# Patient Record
Sex: MALE | Race: WHITE | Employment: OTHER | URBAN - METROPOLITAN AREA
[De-identification: names, ages, dates, MRNs, and addresses within clinical notes are randomized per-mention and may not be internally consistent; named-entity substitution may affect disease eponyms.]

---

## 2017-01-18 ENCOUNTER — RECORDS - HEALTHEAST (OUTPATIENT)
Dept: LAB | Facility: CLINIC | Age: 72
End: 2017-01-18

## 2017-01-18 LAB
CHOLEST SERPL-MCNC: 197 MG/DL
FASTING STATUS PATIENT QL REPORTED: YES
HDLC SERPL-MCNC: 42 MG/DL
LDLC SERPL CALC-MCNC: 134 MG/DL
PSA SERPL-MCNC: 0.9 NG/ML (ref 0–4.5)
TRIGL SERPL-MCNC: 106 MG/DL

## 2017-11-09 ENCOUNTER — RECORDS - HEALTHEAST (OUTPATIENT)
Dept: ADMINISTRATIVE | Facility: OTHER | Age: 72
End: 2017-11-09

## 2017-12-07 ENCOUNTER — RECORDS - HEALTHEAST (OUTPATIENT)
Dept: ADMINISTRATIVE | Facility: OTHER | Age: 72
End: 2017-12-07

## 2017-12-11 ENCOUNTER — RECORDS - HEALTHEAST (OUTPATIENT)
Dept: ADMINISTRATIVE | Facility: OTHER | Age: 72
End: 2017-12-11

## 2018-01-18 ENCOUNTER — RECORDS - HEALTHEAST (OUTPATIENT)
Dept: ADMINISTRATIVE | Facility: OTHER | Age: 73
End: 2018-01-18

## 2018-01-19 ENCOUNTER — RECORDS - HEALTHEAST (OUTPATIENT)
Dept: ADMINISTRATIVE | Facility: OTHER | Age: 73
End: 2018-01-19

## 2018-02-12 ENCOUNTER — HOSPITAL ENCOUNTER (OUTPATIENT)
Dept: RADIOLOGY | Facility: HOSPITAL | Age: 73
Discharge: HOME | End: 2018-02-12

## 2018-02-12 DIAGNOSIS — J18.9 RECURRENT PNEUMONIA: ICD-10-CM

## 2018-03-08 ENCOUNTER — OFFICE VISIT - HEALTHEAST (OUTPATIENT)
Dept: PULMONOLOGY | Facility: OTHER | Age: 73
End: 2018-03-08

## 2018-03-08 DIAGNOSIS — J18.9 RECURRENT PNEUMONIA: ICD-10-CM

## 2018-03-08 DIAGNOSIS — J18.9 PNEUMONIA: ICD-10-CM

## 2018-03-08 ASSESSMENT — MIFFLIN-ST. JEOR: SCORE: 1671.36

## 2018-04-06 ENCOUNTER — HOSPITAL ENCOUNTER (OUTPATIENT)
Dept: CT IMAGING | Facility: HOSPITAL | Age: 73
Discharge: HOME | End: 2018-04-06
Attending: INTERNAL MEDICINE

## 2018-04-06 ENCOUNTER — COMMUNICATION - HEALTHEAST (OUTPATIENT)
Dept: INTENSIVE CARE | Facility: CLINIC | Age: 73
End: 2018-04-06

## 2018-04-06 ENCOUNTER — HOSPITAL ENCOUNTER (OUTPATIENT)
Dept: RESPIRATORY THERAPY | Facility: HOSPITAL | Age: 73
Discharge: HOME | End: 2018-04-06
Attending: INTERNAL MEDICINE

## 2018-04-06 DIAGNOSIS — J18.9 PNEUMONIA: ICD-10-CM

## 2018-04-06 LAB — HGB BLD-MCNC: 15.9 G/DL (ref 14–18)

## 2018-05-11 ENCOUNTER — OFFICE VISIT - HEALTHEAST (OUTPATIENT)
Dept: PULMONOLOGY | Facility: OTHER | Age: 73
End: 2018-05-11

## 2018-05-11 DIAGNOSIS — J18.9 RECURRENT PNEUMONIA: ICD-10-CM

## 2018-10-24 ENCOUNTER — RECORDS - HEALTHEAST (OUTPATIENT)
Dept: LAB | Facility: CLINIC | Age: 73
End: 2018-10-24

## 2018-10-26 LAB — BACTERIA SPEC CULT: NORMAL

## 2018-11-28 ENCOUNTER — RECORDS - HEALTHEAST (OUTPATIENT)
Dept: LAB | Facility: CLINIC | Age: 73
End: 2018-11-28

## 2018-11-28 LAB
ALBUMIN SERPL-MCNC: 3.7 G/DL (ref 3.5–5)
ALP SERPL-CCNC: 59 U/L (ref 45–120)
ALT SERPL W P-5'-P-CCNC: 32 U/L (ref 0–45)
ANION GAP SERPL CALCULATED.3IONS-SCNC: 9 MMOL/L (ref 5–18)
AST SERPL W P-5'-P-CCNC: 23 U/L (ref 0–40)
BILIRUB SERPL-MCNC: 0.4 MG/DL (ref 0–1)
BUN SERPL-MCNC: 27 MG/DL (ref 8–28)
CALCIUM SERPL-MCNC: 9.4 MG/DL (ref 8.5–10.5)
CHLORIDE BLD-SCNC: 106 MMOL/L (ref 98–107)
CHOLEST SERPL-MCNC: 147 MG/DL
CO2 SERPL-SCNC: 23 MMOL/L (ref 22–31)
CREAT SERPL-MCNC: 1.01 MG/DL (ref 0.7–1.3)
FASTING STATUS PATIENT QL REPORTED: ABNORMAL
GFR SERPL CREATININE-BSD FRML MDRD: >60 ML/MIN/1.73M2
GLUCOSE BLD-MCNC: 89 MG/DL (ref 70–125)
HDLC SERPL-MCNC: 36 MG/DL
LDLC SERPL CALC-MCNC: 86 MG/DL
POTASSIUM BLD-SCNC: 4.5 MMOL/L (ref 3.5–5)
PROT SERPL-MCNC: 7 G/DL (ref 6–8)
SODIUM SERPL-SCNC: 138 MMOL/L (ref 136–145)
TRIGL SERPL-MCNC: 126 MG/DL

## 2018-12-04 ENCOUNTER — RECORDS - HEALTHEAST (OUTPATIENT)
Dept: ADMINISTRATIVE | Facility: OTHER | Age: 73
End: 2018-12-04

## 2018-12-21 ENCOUNTER — AMBULATORY - HEALTHEAST (OUTPATIENT)
Dept: PULMONOLOGY | Facility: OTHER | Age: 73
End: 2018-12-21

## 2018-12-21 ENCOUNTER — OFFICE VISIT - HEALTHEAST (OUTPATIENT)
Dept: PULMONOLOGY | Facility: OTHER | Age: 73
End: 2018-12-21

## 2018-12-21 DIAGNOSIS — R09.82 PND (POST-NASAL DRIP): ICD-10-CM

## 2018-12-21 DIAGNOSIS — R05.9 COUGH: ICD-10-CM

## 2018-12-21 DIAGNOSIS — R05.3 CHRONIC COUGH: ICD-10-CM

## 2018-12-21 ASSESSMENT — MIFFLIN-ST. JEOR: SCORE: 1699.94

## 2019-01-08 ENCOUNTER — RECORDS - HEALTHEAST (OUTPATIENT)
Dept: RADIOLOGY | Facility: CLINIC | Age: 74
End: 2019-01-08

## 2019-03-20 ENCOUNTER — OFFICE VISIT - HEALTHEAST (OUTPATIENT)
Dept: PULMONOLOGY | Facility: OTHER | Age: 74
End: 2019-03-20

## 2019-03-20 DIAGNOSIS — R05.3 CHRONIC COUGH: ICD-10-CM

## 2019-03-20 RX ORDER — BENZONATATE 100 MG/1
100 CAPSULE ORAL 3 TIMES DAILY PRN
Qty: 90 CAPSULE | Refills: 6 | Status: SHIPPED | OUTPATIENT
Start: 2019-03-20

## 2020-01-04 ENCOUNTER — RECORDS - HEALTHEAST (OUTPATIENT)
Dept: LAB | Facility: CLINIC | Age: 75
End: 2020-01-04

## 2020-01-05 LAB
ALBUMIN SERPL-MCNC: 4 G/DL (ref 3.5–5)
ALP SERPL-CCNC: 64 U/L (ref 45–120)
ALT SERPL W P-5'-P-CCNC: 20 U/L (ref 0–45)
ANION GAP SERPL CALCULATED.3IONS-SCNC: 12 MMOL/L (ref 5–18)
AST SERPL W P-5'-P-CCNC: 20 U/L (ref 0–40)
BILIRUB SERPL-MCNC: 0.6 MG/DL (ref 0–1)
BUN SERPL-MCNC: 22 MG/DL (ref 8–28)
CALCIUM SERPL-MCNC: 9.6 MG/DL (ref 8.5–10.5)
CHLORIDE BLD-SCNC: 104 MMOL/L (ref 98–107)
CO2 SERPL-SCNC: 23 MMOL/L (ref 22–31)
CREAT SERPL-MCNC: 1.11 MG/DL (ref 0.7–1.3)
ERYTHROCYTE [SEDIMENTATION RATE] IN BLOOD BY WESTERGREN METHOD: 19 MM/HR (ref 0–15)
GFR SERPL CREATININE-BSD FRML MDRD: >60 ML/MIN/1.73M2
GLUCOSE BLD-MCNC: 114 MG/DL (ref 70–125)
MAGNESIUM SERPL-MCNC: 2.1 MG/DL (ref 1.8–2.6)
POTASSIUM BLD-SCNC: 4.8 MMOL/L (ref 3.5–5)
PROT SERPL-MCNC: 7.4 G/DL (ref 6–8)
SODIUM SERPL-SCNC: 139 MMOL/L (ref 136–145)
TSH SERPL DL<=0.005 MIU/L-ACNC: 3.62 UIU/ML (ref 0.3–5)
VIT B12 SERPL-MCNC: 325 PG/ML (ref 213–816)

## 2020-03-19 ENCOUNTER — RECORDS - HEALTHEAST (OUTPATIENT)
Dept: LAB | Facility: CLINIC | Age: 75
End: 2020-03-19

## 2020-03-19 LAB
ANION GAP SERPL CALCULATED.3IONS-SCNC: 11 MMOL/L (ref 5–18)
BUN SERPL-MCNC: 18 MG/DL (ref 8–28)
CALCIUM SERPL-MCNC: 9 MG/DL (ref 8.5–10.5)
CHLORIDE BLD-SCNC: 104 MMOL/L (ref 98–107)
CO2 SERPL-SCNC: 22 MMOL/L (ref 22–31)
CREAT SERPL-MCNC: 0.87 MG/DL (ref 0.7–1.3)
GFR SERPL CREATININE-BSD FRML MDRD: >60 ML/MIN/1.73M2
GLUCOSE BLD-MCNC: 119 MG/DL (ref 70–125)
POTASSIUM BLD-SCNC: 4.1 MMOL/L (ref 3.5–5)
SODIUM SERPL-SCNC: 137 MMOL/L (ref 136–145)

## 2021-02-04 ENCOUNTER — RECORDS - HEALTHEAST (OUTPATIENT)
Dept: LAB | Facility: CLINIC | Age: 76
End: 2021-02-04

## 2021-02-04 LAB
ALBUMIN SERPL-MCNC: 4.2 G/DL (ref 3.5–5)
ALP SERPL-CCNC: 54 U/L (ref 45–120)
ALT SERPL W P-5'-P-CCNC: 33 U/L (ref 0–45)
ANION GAP SERPL CALCULATED.3IONS-SCNC: 13 MMOL/L (ref 5–18)
AST SERPL W P-5'-P-CCNC: 26 U/L (ref 0–40)
BILIRUB SERPL-MCNC: 0.8 MG/DL (ref 0–1)
BUN SERPL-MCNC: 25 MG/DL (ref 8–28)
CALCIUM SERPL-MCNC: 9.1 MG/DL (ref 8.5–10.5)
CHLORIDE BLD-SCNC: 108 MMOL/L (ref 98–107)
CHOLEST SERPL-MCNC: 117 MG/DL
CO2 SERPL-SCNC: 20 MMOL/L (ref 22–31)
CREAT SERPL-MCNC: 1.03 MG/DL (ref 0.7–1.3)
FASTING STATUS PATIENT QL REPORTED: ABNORMAL
GFR SERPL CREATININE-BSD FRML MDRD: >60 ML/MIN/1.73M2
GLUCOSE BLD-MCNC: 92 MG/DL (ref 70–125)
HDLC SERPL-MCNC: 35 MG/DL
LDLC SERPL CALC-MCNC: 62 MG/DL
POTASSIUM BLD-SCNC: 4.8 MMOL/L (ref 3.5–5)
PROT SERPL-MCNC: 6.8 G/DL (ref 6–8)
PSA SERPL-MCNC: 0.6 NG/ML (ref 0–6.5)
SODIUM SERPL-SCNC: 141 MMOL/L (ref 136–145)
TRIGL SERPL-MCNC: 98 MG/DL

## 2021-05-25 ENCOUNTER — RECORDS - HEALTHEAST (OUTPATIENT)
Dept: ADMINISTRATIVE | Facility: CLINIC | Age: 76
End: 2021-05-25

## 2021-06-01 VITALS — WEIGHT: 205.7 LBS | BODY MASS INDEX: 29.45 KG/M2 | HEIGHT: 70 IN

## 2021-06-01 VITALS — BODY MASS INDEX: 30.65 KG/M2 | WEIGHT: 210.6 LBS

## 2021-06-02 VITALS — WEIGHT: 212 LBS | BODY MASS INDEX: 30.35 KG/M2 | HEIGHT: 70 IN

## 2021-06-02 VITALS — WEIGHT: 216.2 LBS | BODY MASS INDEX: 31.47 KG/M2

## 2021-06-16 PROBLEM — R05.3 CHRONIC COUGH: Status: ACTIVE | Noted: 2018-12-21

## 2021-06-16 PROBLEM — R09.82 PND (POST-NASAL DRIP): Status: ACTIVE | Noted: 2018-12-21

## 2021-06-16 NOTE — PROGRESS NOTES
Pulmonary Clinic Outpatient Consultation    Assessment and Plan:   Sriram Doshi is a 72 y.o. male without significant PMHx seen for recurrent pneumonia, cough and dyspnea. His story and imaging seems to fit with infectious pneumonia. Symptomatically, he seems to have responded favorably to several rounds of antibiotics as well as oseltamivir. He is not quite back to his baseline but feels significantly improved since his symptoms began back in November. His CT scans do raise the possibility of an organizing pneumonia or other interstitial process and we will need to do another scan in several months to ensure that the prior consolidations are clearing up. There are no findings to suggest malignancy at this time. If they are still there and he has symptoms will need to pursue an invasive workup with bronchoscopy.    Recommendations:  - Repeat CT chest 2 months from January scan  - PFTs at next visit  - recommended OTC therapies for cough such as tessalon perles. He prefers to avoid anything with narcotics.  - encouraged him to remain active and keep exercising  - I listed levofloxacin as an allergy and removed the PCN allergy which is likely clinically insignificant  - UTD with PSV23. PCV13 will be given today. He did not get a flu shot this year due to his recent respiratory illnesses.  - Follow up with me in 2 months. Further testing will depend on his symptoms and the appearance of the next CT scan.    All questions answered, patient agrees with the plan.      CCx: recurrent pneumonia    HPI: 72 year old gentleman without significant PMHx seen in consultation for recurrent pneumonia. His story began in November of 2017 when he presented to his PMD with cough. He was given a course of doxycycline for 10 days. He followed up in Dec 2017 with improvement in symptoms but repeat CT in Jan 2018 showed persistent right-sided infiltrates and some new areas of bronchopneumonia. No masses or adenopathy were seen. He was  "given another course of antibiotics, this time Levofloxacin. He also received a course of Tamiflu at some point but he is not sure when. He had significant joint pains with the levofloxacin. He underwent swallow eval recently that was negative for aspiration. He does feel significantly improved since the Fall. He is able to exercise more and his cough is much less bothersome. He does feel he is out of shape due to the recent illnesses and is trying to increase his activity level.    He is using albuterol nebs. He does eat lying down but says he does not have issues with reflux or aspiration. Running 5-9 miles per week in an indoor gym.     At baseline he is very active and healthy. No chest pain. Very occasional cough.    On and off 2 year smoker. Quit 1969.    ROS:  A 12-system review was obtained and was negative with the exception of the symptoms endorsed in the history of present illness.    PMH:  No past medical history on file.    PSH:  No past surgical history on file.    Allergies:  Allergies   Allergen Reactions     Penicillins Rash       Family HX:  No family history on file.    Social Hx:  Social History     Social History     Marital status:      Spouse name: N/A     Number of children: N/A     Years of education: N/A     Occupational History     Not on file.     Social History Main Topics     Smoking status: Former Smoker     Quit date: 3/8/1969     Smokeless tobacco: Never Used     Alcohol use Not on file     Drug use: Not on file     Sexual activity: Not on file     Other Topics Concern     Not on file     Social History Narrative     No narrative on file       Current Meds:  No current outpatient prescriptions on file.     No current facility-administered medications for this visit.        Physical Exam:  /76  Pulse (!) 56  Resp 20  Ht 5' 9.5\" (1.765 m)  Wt 205 lb 11.2 oz (93.3 kg)  SpO2 95% Comment: RA  BMI 29.94 kg/m2  Gen: awake, alert, oriented, no distress  HEENT: nasal " turbinates are unremarkable, no oropharyngeal lesions, no cervical or supraclavicular lymphadenopathy  CV: RRR, no M/G/R  Resp: slight right basilar crackles. Left is clear. No wheezing or rhonchi.  Abd: soft, nontender, no palpable organomegaly  Skin: no apparent rashes  Ext: no cyanosis, clubbing or edema  Neuro: alert, nonfocal    Labs:  reviewed    Imaging studies:  Swallow study Jan 2018:  IMPRESSION:   CONCLUSION:  1.  No aspiration. There is transient penetration of thin consistency barium.  2.  Early pourover noted.  3.  See the speech pathology report for details.    CT scans from Dec 2017 and Jan 2018 were personally reviewed. Scans consistent with persistent bronchopneumonia and possibly organizing pneumonia with right sided predominance.    PFT's   None available    Jorge Luis (Kwaku) MD Cassie  [facility] Pulmonary & [facility]  Pager [phone]  Clinic [phone]

## 2021-06-16 NOTE — PROGRESS NOTES
"Speech Language/Pathology  Videofluoroscopic Swallow Study       Problem:  There is no problem list on file for this patient.      Onset date: 1/19/2018 (order date)  Reason for evaluation: Assess for dysphagia/aspiration  Pertinent History: Recurrent pneumonia  Current Diet: Regular textures and thin liquids  Baseline Diet: Regular textures and thin liquids    Patient is a 72 year old male referred due to concerns of aspiration. He reports, \"I've had pneumonia all winter.\" When asked if he experiences any coughing or choking when eating or drinking, patient states, \"Well, I have some bad eating habits. I eat laying down a lot. My father did that too.\" The purpose of this evaluation is to evaluate the oropharyngeal phase of patient's swallow, determine his aspiration risk, and rule out aspiration as the cause of his recurrent pneumonia.    Patient presents as pleasant and cooperative during this evaluation. He appears to be in good health overall. No coughing, wheezing, etc. was noted during this visit.  An  was not applicable    Patient was given honey-thick, puree, and thin consistencies of barium, as well as a solid (i.e., barium-coated rahul cracker).    Oral Phase:    Dentition/Oral hygiene: Adequate    Bolus prep and oral control were not impaired. Mastication was safe and effective and the patient used rotary chewing.    Anterior-Posterior transit was not impaired.    Premature spillage did not occur with any consistency.    Tongue base retraction was not observed .    Oral stasis did not occur with any consistency.    Pharyngeal Phase:    Aspiration did not occur with any consistency during this study.     There was transient laryngeal penetration with sips of thin liquid taken via straw. This was shallow and cleared spontaneously. No penetration noted with sips of thin via cup.    Swallow response was delayed with thin liquid. This resulted in pourover past the epiglottis and, at times, to the " pyriform sinuses.    Epiglottic movement was complete consistently across texture trials.    Pharyngeal stasis did not occur with any consistency.    Pharyngeal constriction was not impaired.    Hyolaryngeal elevation was mildly reduced. Hyolaryngeal excursion was mildly reduced.    Cricopharyngeal function was not impaired. Cervical esophageal function was not impaired.    Assessment:    Patient demonstrated no oral dysphagia and mild pharyngeal dysphagia. Overall, patient's swallow function is WNL for his age group.    Patient is at low aspiration risk with all intake. Aspiration risk increases slightly if patient drinks thin liquids with a straw.    Rehab potential is good based on prior level of function and evaluation results.    Recommendations:    Plan: Continue current diet of Regular textures and thin liquids    Strategies: Patient to follow standard aspiration precautions, including sitting fully upright for all intake, eating slowly, and taking small bites and sips. Patient may also wish to avoid drinking from straws.    Speech Therapy is not recommended at this time    Referrals: N/A     Reviewed history of swallow problem with patient and verbally explained roles of SLP and radiologist. Verbally explained process of VFSS prior to administration of barium. Verbally explained results and recommendations to patient. SLP answered questions and stated that a written copy of this report will be faxed to patient's referring provider. Patient verbalized understanding.    30 dysphagia minutes    Eleanor Finn MA, CCC-SLP

## 2021-06-17 NOTE — PROGRESS NOTES
Pulmonary Clinic Follow-up Visit    Assessment and Plan:   Sriram Doshi is a 72 y.o. male without significant PMHx seen for recurrent pneumonia, cough and dyspnea. He has improved significantly. CT chest looks much better so I doubt he has organizing pneumonia at this point. PFTs are normal. He is able to exercise regularly without breathing limitations. Explained that his cough may persist for up to several months after the pneumonia.     Recommendations:  - Encouraged him to continue to exercise and lose weight  - UTD with PSV23 and PCV13. Recommend annual flu shot.  - He can follow up with his PMD going forward; he will call me if he has any questions or concerns.    CCx: follow up of recurrent  pneumonia    HPI: Interim history: I last saw Refugio on 3/8. Since that time, he has been doing very well. Still has a nagging cough at times, non productive, very mild and generally does not bother him. He is trying to lose weight - wants to lose about 20lbs. He still runs and bikes regularly without any limitations.    ROS:  A 12-system review was obtained and was negative with the exception of the symptoms endorsed in the history of present illness.    PMH:  Past Medical History:   Diagnosis Date     Influenza A with pneumonia     winter 2018       PSH:  No past surgical history on file.    Allergies:  Allergies   Allergen Reactions     Levofloxacin      Caused severe joint pains and aches       Family HX:  No family history on file.    Social Hx:  Social History     Social History     Marital status:      Spouse name: N/A     Number of children: N/A     Years of education: N/A     Occupational History     Not on file.     Social History Main Topics     Smoking status: Former Smoker     Packs/day: 1.00     Years: 3.00     Quit date: 3/8/1969     Smokeless tobacco: Never Used     Alcohol use Not on file     Drug use: Not on file     Sexual activity: Not on file     Other Topics Concern     Not on file     Social  History Narrative       Current Meds:  No current outpatient prescriptions on file.     No current facility-administered medications for this visit.        Physical Exam:  /76  Pulse (!) 52  Resp 20  Wt 210 lb 9.6 oz (95.5 kg)  SpO2 95% Comment: RA  BMI 30.65 kg/m2  Gen: awake, alert, oriented, no distress  HEENT: nasal turbinates are unremarkable, no oropharyngeal lesions, no cervical or supraclavicular lymphadenopathy  CV: RRR, no M/G/R  Resp: CTAB, no focal crackles or wheezes  Abd: soft, nontender, no palpable organomegaly  Skin: no apparent rashes  Ext: no cyanosis, clubbing or edema  Neuro: alert, nonfocal    Labs:  reviewed    Imaging studies:  CT chest April 2018  IMPRESSION:   CONCLUSION:  1.  Near resolution of the consolidative opacities in the right upper lobe, which were likely infectious. Otherwise no change from the comparison study.  2.  Coronary artery calcification.  3.  Hepatic steatosis.    PFT's  FEV1/FVC is 0.77 and is normal.  FEV1 is 91% predicted and is normal.  FVC is 89% predicted and normal.  There was no improvement in spirometry after a single inhaled dose of bronchodilator.  TLC is 92% predicted and is normal.  RV is 110% predicted and is normal.  DLCO is 109% predicted and is normal when it   is corrected for hemoglobin.     Impression:  Full Pulmonary Function Test is normal.  Flow volume loops do not suggest any abnormalities.    Jorge Luis Matthews MD (Avi)  [facility] Pulmonary & Critical Care  Pager [phone]  Clinic [phone]

## 2021-06-17 NOTE — PROGRESS NOTES
RESPIRATORY CARE NOTE     Patient Name: Sriram Doshi  Today's Date: 4/6/2018     Complete PFT done. Pt performed tests with good effort. Test results meet ATS criteria. Results scanned into epic. Pt left in no distress.       Leonarda Ulloa

## 2021-06-19 NOTE — LETTER
Letter by Jorge Luis Matthews MD at      Author: Jorge Luis Matthews MD Service: -- Author Type: --    Filed:  Encounter Date: 3/20/2019 Status: (Other)         Tian Mauro MD  [address] [place] [id number]                                  March 20, 2019    Patient: Sriram Doshi   MR Number: [id number]   YOB: 1945   Date of Visit: 3/20/2019     Dear Dr. Nj MD:    Thank you for referring Sriram Doshi to me for evaluation. Below are the relevant portions of my assessment and plan of care.    If you have questions, please do not hesitate to call me. I look forward to following Sriram along with you.    Sincerely,        Jorge Luis Matthews MD          CC  No Recipients  Jorge Luis Matthews MD  3/20/2019  9:23 AM  Sign at close encounter  Pulmonary Clinic Follow-up Visit    Assessment and Plan:   Sriram Doshi is a 73 y.o. male without significant PMHx seen previously for recurrent pneumonia, cough and dyspnea which had resolved as of April-May 2018. He then came back to me last December for chronic coughin, which we attributed to post-nasal drip. He had symptomatic improvement with nasal corticosteroid. He has no shortness of breath. PFTs were normal last year.     Recommendations:  - continue the flonase for another few weeks, if he's stable I told him he could use it as needed  - continue tessalon perles OTC as needed for coughing  - UTD with PCV13 and PSV23. Recommend annual flu shot.  - encouraged him to continue to exercise and remain active.    Follow up as needed. He'll call me with any questions of concerns.     CCx: follow up of recurrent  pneumonia    HPI: Interim history: I last saw Refugio on on 12/21/2018. At that visit, I recommended a trial of FLonase for PND and chronic coughing. His cough improved. He feels the flonase was helpful. He still coughs occasionally, mostly in the morning, but then he's fine throughout the day. Denies GERD symptoms. He has gained some weight, feels  out of shape, not able to exercise like he used to due to some joint aches/pains and what he calls older age. He is able to do spin classes in the winter and has no breathing limitations. He is hoping to get back into cycling when it gets warmer.     ROS:  A 12-system review was obtained and was negative with the exception of the symptoms endorsed in the history of present illness.    PMH:  Past Medical History:   Diagnosis Date   ? Influenza A with pneumonia     winter 2018       PSH:  No past surgical history on file.    Allergies:  Allergies   Allergen Reactions   ? Levofloxacin      Caused severe joint pains and aches       Family HX:  No family history on file.    Social Hx:  Social History     Socioeconomic History   ? Marital status:      Spouse name: Not on file   ? Number of children: Not on file   ? Years of education: Not on file   ? Highest education level: Not on file   Occupational History   ? Not on file   Social Needs   ? Financial resource strain: Not on file   ? Food insecurity:     Worry: Not on file     Inability: Not on file   ? Transportation needs:     Medical: Not on file     Non-medical: Not on file   Tobacco Use   ? Smoking status: Former Smoker     Packs/day: 1.00     Years: 3.00     Pack years: 3.00     Last attempt to quit: 3/8/1969     Years since quittin.0   ? Smokeless tobacco: Never Used   Substance and Sexual Activity   ? Alcohol use: Not on file   ? Drug use: Not on file   ? Sexual activity: Not on file   Lifestyle   ? Physical activity:     Days per week: Not on file     Minutes per session: Not on file   ? Stress: Not on file   Relationships   ? Social connections:     Talks on phone: Not on file     Gets together: Not on file     Attends Voodoo service: Not on file     Active member of club or organization: Not on file     Attends meetings of clubs or organizations: Not on file     Relationship status: Not on file   ? Intimate partner violence:     Fear of current  or ex partner: Not on file     Emotionally abused: Not on file     Physically abused: Not on file     Forced sexual activity: Not on file   Other Topics Concern   ? Not on file   Social History Narrative   ? Not on file       Current Meds:  Current Outpatient Medications   Medication Sig Dispense Refill   ? fluticasone (FLONASE) 50 mcg/actuation nasal spray 1-2 sprays per nostril daily for 4-6 weeks minimum 16 g 12     No current facility-administered medications for this visit.        Physical Exam:  There were no vitals taken for this visit.  Gen: awake, alert, oriented, no distress  HEENT: nasal turbinates with very mild erythema no edema, improved from last visit.   CV: RRR, no M/G/R  Resp: very slight crackles at right base. Good air mvmt. Now wheezing.  Abd: soft, nontender, no palpable organomegaly  Skin: no apparent rashes  Ext: no cyanosis, clubbing or edema  Neuro: alert, nonfocal    Labs:  Reviewed  Chem panel normal.  Throat culture UF    Imaging studies:  CT chest April 2018  IMPRESSION:   CONCLUSION:  1.  Near resolution of the consolidative opacities in the right upper lobe, which were likely infectious. Otherwise no change from the comparison study.  2.  Coronary artery calcification.  3.  Hepatic steatosis.    PFT's  FEV1/FVC is 0.77 and is normal.  FEV1 is 91% predicted and is normal.  FVC is 89% predicted and normal.  There was no improvement in spirometry after a single inhaled dose of bronchodilator.  TLC is 92% predicted and is normal.  RV is 110% predicted and is normal.  DLCO is 109% predicted and is normal when it   is corrected for hemoglobin.     Impression:  Full Pulmonary Function Test is normal.  Flow volume loops do not suggest any abnormalities.    Jorge Luis (Tania Matthews MD  [facility] Pulmonary & Critical Care  Pager [phone]  Clinic [phone]

## 2021-06-22 NOTE — PROGRESS NOTES
Pulmonary Clinic Follow-up Visit    Assessment and Plan:   Sriram Doshi is a 72 y.o. male without significant PMHx seen previously for recurrent pneumonia, cough and dyspnea which had improved/resolved at our last visit. He returns today with chronic cough since the summer associated with nasal congestion and a dripping sensation in the back of his throat. Nasal passages appear erythematous and edematous today. I suspect he has post-nasal drip or allergic rhinitis and would benefit from a trial of a nasal corticosteroid. He had a normal CXR, normal PFTs previously and good exercise tolerance.     Recommendations:  - trial of flonase 1-2 sprays per nostril daily. Recommended minimum 4-6 week trial  - tessalon perles OTC as needed for coughing  - UTD with PCV13 and PSV23. Recommend annual flu shot.  - encouraged him to continue to exercise and remain active.  - follow 1-2 months for reassessment    CCx: follow up of recurrent  pneumonia    HPI: Interim history: I last saw Refugio on on 5/11. He was doing well from that visit and I discharged him from clinic. He returns today because he has been having chronic cough since over the summer. It is occasionally productive. No hemoptysis. Breathing is generally fine, feels out of shape not able to run as much as he used to, since he had the influenza last winter before I met him.  Otherwise feels about the same.  Does report significant sinus congestion and dripping into the back of his throat.  Apparently saw ENT recently, was told he had a lot of congestion near the vocal cords. Records from that visit are not available to me.  He did have a CXR recently which was reportedly normal.   He is able to jog daily (13-20min per mile) and does spin classes a few times per week.    ROS:  A 12-system review was obtained and was negative with the exception of the symptoms endorsed in the history of present illness.    PMH:  Past Medical History:   Diagnosis Date     Influenza A with  "pneumonia     winter 2018       PSH:  No past surgical history on file.    Allergies:  Allergies   Allergen Reactions     Levofloxacin      Caused severe joint pains and aches       Family HX:  No family history on file.    Social Hx:  Social History     Socioeconomic History     Marital status:      Spouse name: Not on file     Number of children: Not on file     Years of education: Not on file     Highest education level: Not on file   Social Needs     Financial resource strain: Not on file     Food insecurity - worry: Not on file     Food insecurity - inability: Not on file     Transportation needs - medical: Not on file     Transportation needs - non-medical: Not on file   Occupational History     Not on file   Tobacco Use     Smoking status: Former Smoker     Packs/day: 1.00     Years: 3.00     Pack years: 3.00     Last attempt to quit: 3/8/1969     Years since quittin.8     Smokeless tobacco: Never Used   Substance and Sexual Activity     Alcohol use: Not on file     Drug use: Not on file     Sexual activity: Not on file   Other Topics Concern     Not on file   Social History Narrative     Not on file       Current Meds:  No current outpatient medications on file.     No current facility-administered medications for this visit.        Physical Exam:  /70   Pulse (!) 57   Resp 12   Ht 5' 9.5\" (1.765 m)   Wt 212 lb (96.2 kg)   SpO2 97%   BMI 30.86 kg/m    Gen: awake, alert, oriented, no distress  HEENT: nasal turbinates are swollen and erythematous.  CV: RRR, no M/G/R  Resp: very slight crackles at right base. Good air mvmt. Now wheezing.  Abd: soft, nontender, no palpable organomegaly  Skin: no apparent rashes  Ext: no cyanosis, clubbing or edema  Neuro: alert, nonfocal    Labs:  Reviewed  Chem panel normal.  Throat culture UF    Imaging studies:  CT chest 2018  IMPRESSION:   CONCLUSION:  1.  Near resolution of the consolidative opacities in the right upper lobe, which were likely " infectious. Otherwise no change from the comparison study.  2.  Coronary artery calcification.  3.  Hepatic steatosis.    PFT's  FEV1/FVC is 0.77 and is normal.  FEV1 is 91% predicted and is normal.  FVC is 89% predicted and normal.  There was no improvement in spirometry after a single inhaled dose of bronchodilator.  TLC is 92% predicted and is normal.  RV is 110% predicted and is normal.  DLCO is 109% predicted and is normal when it   is corrected for hemoglobin.     Impression:  Full Pulmonary Function Test is normal.  Flow volume loops do not suggest any abnormalities.    Jorge Luis (Tania Matthews MD  [facility] Pulmonary & Critical Care  Pager [phone]  Clinic [phone]

## 2021-06-22 NOTE — PROGRESS NOTES
CD of images received from patient, unsure where this was done at    Images on CD is chest xray completed on 11/28/18    CD sent to radiology to be upload into Nil.

## 2021-06-25 NOTE — PROGRESS NOTES
Pulmonary Clinic Follow-up Visit    Assessment and Plan:   Sriram Doshi is a 73 y.o. male without significant PMHx seen previously for recurrent pneumonia, cough and dyspnea which had resolved as of April-May 2018. He then came back to me last December for chronic coughin, which we attributed to post-nasal drip. He had symptomatic improvement with nasal corticosteroid. He has no shortness of breath. PFTs were normal last year.     Recommendations:  - continue the flonase for another few weeks, if he's stable I told him he could use it as needed  - continue tessalon perles OTC as needed for coughing  - UTD with PCV13 and PSV23. Recommend annual flu shot.  - encouraged him to continue to exercise and remain active.    Follow up as needed. He'll call me with any questions of concerns.     CCx: follow up of recurrent  pneumonia    HPI: Interim history: I last saw Refugio on on 12/21/2018. At that visit, I recommended a trial of FLonase for PND and chronic coughing. His cough improved. He feels the flonase was helpful. He still coughs occasionally, mostly in the morning, but then he's fine throughout the day. Denies GERD symptoms. He has gained some weight, feels out of shape, not able to exercise like he used to due to some joint aches/pains and what he calls older age. He is able to do spin classes in the winter and has no breathing limitations. He is hoping to get back into cycling when it gets warmer.     ROS:  A 12-system review was obtained and was negative with the exception of the symptoms endorsed in the history of present illness.    PMH:  Past Medical History:   Diagnosis Date     Influenza A with pneumonia     winter 2018       PSH:  No past surgical history on file.    Allergies:  Allergies   Allergen Reactions     Levofloxacin      Caused severe joint pains and aches       Family HX:  No family history on file.    Social Hx:  Social History     Socioeconomic History     Marital status:      Spouse  name: Not on file     Number of children: Not on file     Years of education: Not on file     Highest education level: Not on file   Occupational History     Not on file   Social Needs     Financial resource strain: Not on file     Food insecurity:     Worry: Not on file     Inability: Not on file     Transportation needs:     Medical: Not on file     Non-medical: Not on file   Tobacco Use     Smoking status: Former Smoker     Packs/day: 1.00     Years: 3.00     Pack years: 3.00     Last attempt to quit: 3/8/1969     Years since quittin.0     Smokeless tobacco: Never Used   Substance and Sexual Activity     Alcohol use: Not on file     Drug use: Not on file     Sexual activity: Not on file   Lifestyle     Physical activity:     Days per week: Not on file     Minutes per session: Not on file     Stress: Not on file   Relationships     Social connections:     Talks on phone: Not on file     Gets together: Not on file     Attends [employer] service: Not on file     Active member of club or organization: Not on file     Attends meetings of clubs or organizations: Not on file     Relationship status: Not on file     Intimate partner violence:     Fear of current or ex partner: Not on file     Emotionally abused: Not on file     Physically abused: Not on file     Forced sexual activity: Not on file   Other Topics Concern     Not on file   Social History Narrative     Not on file       Current Meds:  Current Outpatient Medications   Medication Sig Dispense Refill     fluticasone (FLONASE) 50 mcg/actuation nasal spray 1-2 sprays per nostril daily for 4-6 weeks minimum 16 g 12     No current facility-administered medications for this visit.        Physical Exam:  There were no vitals taken for this visit.  Gen: awake, alert, oriented, no distress  HEENT: nasal turbinates with very mild erythema no edema, improved from last visit.   CV: RRR, no M/G/R  Resp: very slight crackles at right base. Good air mvmt. Now  wheezing.  Abd: soft, nontender, no palpable organomegaly  Skin: no apparent rashes  Ext: no cyanosis, clubbing or edema  Neuro: alert, nonfocal    Labs:  Reviewed  Chem panel normal.  Throat culture UF    Imaging studies:  CT chest April 2018  IMPRESSION:   CONCLUSION:  1.  Near resolution of the consolidative opacities in the right upper lobe, which were likely infectious. Otherwise no change from the comparison study.  2.  Coronary artery calcification.  3.  Hepatic steatosis.    PFT's  FEV1/FVC is 0.77 and is normal.  FEV1 is 91% predicted and is normal.  FVC is 89% predicted and normal.  There was no improvement in spirometry after a single inhaled dose of bronchodilator.  TLC is 92% predicted and is normal.  RV is 110% predicted and is normal.  DLCO is 109% predicted and is normal when it   is corrected for hemoglobin.     Impression:  Full Pulmonary Function Test is normal.  Flow volume loops do not suggest any abnormalities.    Jorge Luis (Tania Matthews MD  [facility]  Pager [phone]  Clinic [phone]

## 2022-02-22 ENCOUNTER — MEDICAL CORRESPONDENCE (OUTPATIENT)
Dept: HEALTH INFORMATION MANAGEMENT | Facility: CLINIC | Age: 77
End: 2022-02-22
Payer: COMMERCIAL

## 2022-05-24 ENCOUNTER — OFFICE VISIT (OUTPATIENT)
Dept: NEUROLOGY | Facility: CLINIC | Age: 77
End: 2022-05-24
Payer: COMMERCIAL

## 2022-05-24 VITALS
HEART RATE: 59 BPM | BODY MASS INDEX: 29.63 KG/M2 | SYSTOLIC BLOOD PRESSURE: 126 MMHG | DIASTOLIC BLOOD PRESSURE: 68 MMHG | HEIGHT: 70 IN | WEIGHT: 207 LBS

## 2022-05-24 DIAGNOSIS — R41.89 COGNITIVE DECLINE: Primary | ICD-10-CM

## 2022-05-24 PROCEDURE — 99205 OFFICE O/P NEW HI 60 MIN: CPT | Performed by: PSYCHIATRY & NEUROLOGY

## 2022-05-24 RX ORDER — TAMSULOSIN HYDROCHLORIDE 0.4 MG/1
0.4 CAPSULE ORAL DAILY
COMMUNITY
Start: 2020-08-16

## 2022-05-24 RX ORDER — ASPIRIN 81 MG/1
81 TABLET, CHEWABLE ORAL DAILY
COMMUNITY

## 2022-05-24 RX ORDER — ATORVASTATIN CALCIUM 10 MG/1
10 TABLET, FILM COATED ORAL DAILY
COMMUNITY
Start: 2022-02-22

## 2022-05-24 RX ORDER — LORAZEPAM 1 MG/1
TABLET ORAL
Qty: 2 TABLET | Refills: 0 | Status: SHIPPED | OUTPATIENT
Start: 2022-05-24

## 2022-05-24 ASSESSMENT — MONTREAL COGNITIVE ASSESSMENT (MOCA)
10. [FLUENCY] NAME WORDS STARTING WITH DESIGNATED LETTER: 1
7. [VIGILENCE] TAP WHEN HEARING DESIGNATED LETTER: 1
12. MEMORY INDEX SCORE: 2
VISUOSPATIAL/EXECUTIVE SUBSCORE: 3
9. REPEAT EACH SENTENCE: 2
WHAT LEVEL OF EDUCATION WAS ATTAINED: 0
WHAT IS THE TOTAL SCORE (OUT OF 30): 22
13. ORIENTATION SUBSCORE: 6
6. READ LIST OF DIGITS [FORWARD/BACKWARD]: 1
8. SERIAL SUBTRACTION OF 7S: 1
11. FOR EACH PAIR OF WORDS, WHAT CATEGORY DO THEY BELONG TO (OUT OF 2): 2
4. NAME EACH OF THE THREE ANIMALS SHOWN: 3

## 2022-05-24 NOTE — PROGRESS NOTES
NEUROLOGY OUTPATIENT CONSULT NOTE  May 24, 2022     CHIEF COMPLAINT/REASON FOR VISIT/REASON FOR CONSULT  Patient presents with:  New Patient: Short Term Memory Loss    REASON FOR CONSULTATION- Memory problems    REFERRAL SOURCE  Dr. Romel Pace V  CC Dr. Romel Pace V    HISTORY OF PRESENT ILLNESS  Sriram Doshi is a 77 year old male seen today for evaluation of memory problems.  He reports that he has been having memory problems for the last 6 months that they are getting better with time.  He thinks that they might have brought in by using too much of the atorvastatin and he is cut down from 20 mg to 10 mg with improvement.  He reports difficulty with multitasking.  He has to focus on 1 task at a time.  He will read things and then not remember what he read and has to read them more than once.  Does not really have any difficulty remembering conversations or having difficulty finding words.  No issues with driving.  Occasionally will get lost but this is very rare.  Mainly he will get distracted and forget to take the turn.  Reports that his memory loss is mainly short-term.  Is getting about 6 hours of sleep every night.  Feels well rested in the morning.  Used to work for [employer] with no problems at work.  No personality changes.  No hospitalizations.  No REM behavior disorder.    Previous history is reviewed and this is unchanged.    PAST MEDICAL/SURGICAL HISTORY  No past medical history on file.  Patient Active Problem List   Diagnosis     PND (post-nasal drip)     Chronic cough   Significant for high cholesterol, amputated toe    FAMILY HISTORY  No family history on file.   Positive for tremors, Parkinson's disease in his father and brother, high blood pressure    SOCIAL HISTORY  Social History     Tobacco Use     Smoking status: Former Smoker     Packs/day: 1.00     Years: 3.00     Pack years: 3.00     Quit date: 3/8/1969     Years since quittin.2     Smokeless tobacco: Never Used       SYSTEMS  "REVIEW  Twelve-system ROS was done and other than the HPI this was negative except bladder symptoms, hearing loss, memory loss, anxiety, joint pain, numbness and tingling.    MEDICATIONS  aspirin (ASA) 81 MG chewable tablet, Take 81 mg by mouth daily  atorvastatin (LIPITOR) 10 MG tablet, Take 10 mg by mouth daily  fluticasone (FLONASE) 50 mcg/actuation nasal spray, [FLUTICASONE (FLONASE) 50 MCG/ACTUATION NASAL SPRAY] 1-2 sprays per nostril daily for 4-6 weeks minimum  tamsulosin (FLOMAX) 0.4 MG capsule,   benzonatate (TESSALON PERLES) 100 MG capsule, [BENZONATATE (TESSALON PERLES) 100 MG CAPSULE] Take 1 capsule (100 mg total) by mouth 3 (three) times a day as needed for cough. (Patient not taking: Reported on 5/24/2022)    No current facility-administered medications on file prior to visit.       PHYSICAL EXAMINATION  VITALS: /68 (BP Location: Right arm, Patient Position: Sitting)   Pulse 59   Ht 1.765 m (5' 9.5\")   Wt 93.9 kg (207 lb)   BMI 30.13 kg/m    GENERAL: Healthy appearing, alert, no acute distress, normal habitus.  CARDIOVASCULAR: Extremities warm and well perfused. Pulses present.   EYES: Funduscopic exam limited.  NEUROLOGICAL:  Patient is awake and oriented to self, place and time.  Attention span is normal.  Memory is grossly intact; MoCA 22.  Language is fluent and follows commands appropriately.  Appropriate fund of knowledge. Cranial nerves 2-12 are intact. There is no pronator drift.  Motor exam shows 5/5 strength in all extremities.  Tone is symmetric bilaterally in upper and lower extremities.  Reflexes are symmetric and 1+ in upper extremities and lower extremities. Sensory exam is grossly intact to light touch, pin prick and vibration.  Finger to nose and heel to shin is without dysmetria.  Romberg is negative.  Gait is normal and the patient is able to do tandem walk and walk on toes and heels.      DIAGNOSTICS  RELEVANT LABS  Component      Latest Ref Rng & Units 2/4/2021   Sodium   "    136 - 145 mmol/L 141   Potassium      3.5 - 5.0 mmol/L 4.8   Chloride      98 - 107 mmol/L 108 (H)   Carbon Dioxide      22 - 31 mmol/L 20 (L)   Anion Gap      5 - 18 mmol/L 13   Glucose      70 - 125 mg/dL 92   Urea Nitrogen      8 - 28 mg/dL 25   Creatinine      0.70 - 1.30 mg/dL 1.03   GFR Estimate If Black      >60 mL/min/1.73m2 >60   GFR Estimate      >60 mL/min/1.73m2 >60   Bilirubin Total      0.0 - 1.0 mg/dL 0.8   Calcium      8.5 - 10.5 mg/dL 9.1   Protein Total      6.0 - 8.0 g/dL 6.8   Albumin      3.5 - 5.0 g/dL 4.2   Alkaline Phosphatase      45 - 120 U/L 54   AST      0 - 40 U/L 26   ALT      0 - 45 U/L 33       OUTSIDE RECORDS  Outside referral notes and chart notes were reviewed and pertinent information has been summarized (in addition to the HPI):-Referred from vascular clinic.  Has been having memory issues for which was referred to neurology. Doing well with right lower extremity endovascular revascularization.  No previous brain testing/imaging done.      IMPRESSION/REPORT/PLAN  Cognitive decline-suspected mild cognitive impairment    This is a 77 year old male with cognitive decline over the last 6 months.  Exam is noncontributory.  He does score low on the [place] at 22.  Possibly he has mild cognitive impairment or could have early dementia.  I do not think his symptoms are coming from the atorvastatin.  We will check an MRI of the brain to rule out any structural lesions.  We will check blood work and EEG to evaluate for any reversible memory problems.  We will consider neuropsychology evaluation depending on test results.  I can see him back in 6 weeks.    -     MR Brain w/o Contrast; Future  -     EEG Routine; Future  -     Vitamin B12; Future  -     Vitamin B1 whole blood; Future  -     TSH with free T4 reflex; Future  -     Methylmalonic Acid; Future  -     LORazepam (ATIVAN) 1 MG tablet; For MRI. Take 1 tab 30 min before and repeat if needed.    Return in about 6 weeks (around 7/5/2022)  for In-Clinic Visit (must), After testing.    Over 63 minutes were spent coordinating the care for the patient on the day of the encounter.  This includes previsit, during visit and post visit activities as documented above.  Counseling patient.  Multiple tests ordered.  Reviewing outside records/testing.  (Activities include but not inclusive of reviewing chart, reviewing outside records, reviewing labs and imaging study results as well as the images, patient visit time including getting history and exam,  use if applicable, review of test results with the patient and coming up with a plan in a shared model, counseling patient and family, education and answering patient questions, EMR , EMR diagnosis entry and problem list management, medication reconciliation and prescription management if applicable, paperwork if applicable, printing documents and documentation of the visit activities.)  Billing:-4 data points, 4 problem points      Mick Gracia MD  Neurologist  [facility] Neurology [facility]  Tel:- [phone]    This note was dictated using voice recognition software.  Any grammatical or context distortions are unintentional and inherent to the software.

## 2022-05-24 NOTE — LETTER
5/24/2022         RE: Sriram Doshi  [address]  [place] [address]        Dear Colleague,    Thank you for referring your patient, Sriram Doshi, to the [facility] [place]. Please see a copy of my visit note below.    NEUROLOGY OUTPATIENT CONSULT NOTE  May 24, 2022     CHIEF COMPLAINT/REASON FOR VISIT/REASON FOR CONSULT  Patient presents with:  New Patient: Short Term Memory Loss    REASON FOR CONSULTATION- Memory problems    REFERRAL SOURCE  Dr. Romel Pace V  CC Dr. Romel Pace V    HISTORY OF PRESENT ILLNESS  Sriram Doshi is a 77 year old male seen today for evaluation of memory problems.  He reports that he has been having memory problems for the last 6 months that they are getting better with time.  He thinks that they might have brought in by using too much of the atorvastatin and he is cut down from 20 mg to 10 mg with improvement.  He reports difficulty with multitasking.  He has to focus on 1 task at a time.  He will read things and then not remember what he read and has to read them more than once.  Does not really have any difficulty remembering conversations or having difficulty finding words.  No issues with driving.  Occasionally will get lost but this is very rare.  Mainly he will get distracted and forget to take the turn.  Reports that his memory loss is mainly short-term.  Is getting about 6 hours of sleep every night.  Feels well rested in the morning.  Used to work for [employer] with no problems at work.  No personality changes.  No hospitalizations.  No REM behavior disorder.    Previous history is reviewed and this is unchanged.    PAST MEDICAL/SURGICAL HISTORY  No past medical history on file.  Patient Active Problem List   Diagnosis     PND (post-nasal drip)     Chronic cough   Significant for high cholesterol, amputated toe    FAMILY HISTORY  No family history on file.   Positive for tremors, Parkinson's disease in his father and brother,  "high blood pressure    SOCIAL HISTORY  Social History     Tobacco Use     Smoking status: Former Smoker     Packs/day: 1.00     Years: 3.00     Pack years: 3.00     Quit date: 3/8/1969     Years since quittin.2     Smokeless tobacco: Never Used       SYSTEMS REVIEW  Twelve-system ROS was done and other than the HPI this was negative except bladder symptoms, hearing loss, memory loss, anxiety, joint pain, numbness and tingling.    MEDICATIONS  aspirin (ASA) 81 MG chewable tablet, Take 81 mg by mouth daily  atorvastatin (LIPITOR) 10 MG tablet, Take 10 mg by mouth daily  fluticasone (FLONASE) 50 mcg/actuation nasal spray, [FLUTICASONE (FLONASE) 50 MCG/ACTUATION NASAL SPRAY] 1-2 sprays per nostril daily for 4-6 weeks minimum  tamsulosin (FLOMAX) 0.4 MG capsule,   benzonatate (TESSALON PERLES) 100 MG capsule, [BENZONATATE (TESSALON PERLES) 100 MG CAPSULE] Take 1 capsule (100 mg total) by mouth 3 (three) times a day as needed for cough. (Patient not taking: Reported on 2022)    No current facility-administered medications on file prior to visit.       PHYSICAL EXAMINATION  VITALS: /68 (BP Location: Right arm, Patient Position: Sitting)   Pulse 59   Ht 1.765 m (5' 9.5\")   Wt 93.9 kg (207 lb)   BMI 30.13 kg/m    GENERAL: Healthy appearing, alert, no acute distress, normal habitus.  CARDIOVASCULAR: Extremities warm and well perfused. Pulses present.   EYES: Funduscopic exam limited.  NEUROLOGICAL:  Patient is awake and oriented to self, place and time.  Attention span is normal.  Memory is grossly intact; MoCA 22.  Language is fluent and follows commands appropriately.  Appropriate fund of knowledge. Cranial nerves 2-12 are intact. There is no pronator drift.  Motor exam shows 5/5 strength in all extremities.  Tone is symmetric bilaterally in upper and lower extremities.  Reflexes are symmetric and 1+ in upper extremities and lower extremities. Sensory exam is grossly intact to light touch, pin prick and " vibration.  Finger to nose and heel to shin is without dysmetria.  Romberg is negative.  Gait is normal and the patient is able to do tandem walk and walk on toes and heels.      DIAGNOSTICS  RELEVANT LABS  Component      Latest Ref Rng & Units 2/4/2021   Sodium      136 - 145 mmol/L 141   Potassium      3.5 - 5.0 mmol/L 4.8   Chloride      98 - 107 mmol/L 108 (H)   Carbon Dioxide      22 - 31 mmol/L 20 (L)   Anion Gap      5 - 18 mmol/L 13   Glucose      70 - 125 mg/dL 92   Urea Nitrogen      8 - 28 mg/dL 25   Creatinine      0.70 - 1.30 mg/dL 1.03   GFR Estimate If Black      >60 mL/min/1.73m2 >60   GFR Estimate      >60 mL/min/1.73m2 >60   Bilirubin Total      0.0 - 1.0 mg/dL 0.8   Calcium      8.5 - 10.5 mg/dL 9.1   Protein Total      6.0 - 8.0 g/dL 6.8   Albumin      3.5 - 5.0 g/dL 4.2   Alkaline Phosphatase      45 - 120 U/L 54   AST      0 - 40 U/L 26   ALT      0 - 45 U/L 33       OUTSIDE RECORDS  Outside referral notes and chart notes were reviewed and pertinent information has been summarized (in addition to the HPI):-Referred from vascular clinic.  Has been having memory issues for which was referred to neurology. Doing well with right lower extremity endovascular revascularization.  No previous brain testing/imaging done.      IMPRESSION/REPORT/PLAN  Cognitive decline-suspected mild cognitive impairment    This is a 77 year old male with cognitive decline over the last 6 months.  Exam is noncontributory.  He does score low on the [place] at 22.  Possibly he has mild cognitive impairment or could have early dementia.  I do not think his symptoms are coming from the atorvastatin.  We will check an MRI of the brain to rule out any structural lesions.  We will check blood work and EEG to evaluate for any reversible memory problems.  We will consider neuropsychology evaluation depending on test results.  I can see him back in 6 weeks.    -     MR Brain w/o Contrast; Future  -     EEG Routine; Future  -      Vitamin B12; Future  -     Vitamin B1 whole blood; Future  -     TSH with free T4 reflex; Future  -     Methylmalonic Acid; Future  -     LORazepam (ATIVAN) 1 MG tablet; For MRI. Take 1 tab 30 min before and repeat if needed.    Return in about 6 weeks (around 7/5/2022) for In-Clinic Visit (must), After testing.    Over 63 minutes were spent coordinating the care for the patient on the day of the encounter.  This includes previsit, during visit and post visit activities as documented above.  Counseling patient.  Multiple tests ordered.  Reviewing outside records/testing.  (Activities include but not inclusive of reviewing chart, reviewing outside records, reviewing labs and imaging study results as well as the images, patient visit time including getting history and exam,  use if applicable, review of test results with the patient and coming up with a plan in a shared model, counseling patient and family, education and answering patient questions, EMR , EMR diagnosis entry and problem list management, medication reconciliation and prescription management if applicable, paperwork if applicable, printing documents and documentation of the visit activities.)  Billing:-4 data points, 4 problem points      Mick Gracia MD  Neurologist  [facility] Neurology [facility]  Tel:- [phone]    This note was dictated using voice recognition software.  Any grammatical or context distortions are unintentional and inherent to the software.        Again, thank you for allowing me to participate in the care of your patient.        Sincerely,        Mick Gracia MD

## 2022-05-24 NOTE — NURSING NOTE
Chief Complaint   Patient presents with     New Patient     Short Term Memory Loss     Ellen Swan MA on 5/24/2022 at 10:22 AM

## 2022-05-24 NOTE — NURSING NOTE
HEAVENLY COGNITIVE ASSESSMENT (MOCA)  Version 7.1 Original Version  VISUOSPATIAL/EXECUTIVE               COPY CUBE      [ 1   ]                                [    ] DRAW CLOCK (Ten past eleven)  (3 points)    [    ]                    [ 1   ]               [ 1   ]       Contour            Numbers     Hands POINTS                3   / 5   NAMING    [ 1  ]                                                                        [  1  ]                                             [  1  ]  Lidrake Mcfarlane                                Camel                   3  / 3   MEMORY Read list of words, subject must repeat them. Do 2 trials, even if 1st trial is successful. Do a recall after 5 minutes  FACE VELVET [employer] MARILIA RED No Points    1st          2nd         ATTENTION Read list of digits (1 digit/sec) Subject has to repeat in the forward order       [    ]   2  1  8  5  4                                [  1  ] 7 4 2                        1  /2   Read list of letters. The subject must tap with his hand at each letter A. No points if > 2 errors.  [    ] F B A C M N A A J K L B A F A K D E A A A J A M O F A A B             1 /1   Serial 7 subtraction starting at 100          [  1  ] 93         [    ] 86          [    ] 79          [    ] 72         [    ] 65   4 or 5 correct subtractions: 3 points,  2 or 3 correct: 2 points,  1correct: 1 point,   0 correct: 0 points           1 /3   LANGUAGE Repeat: I only know that Tian is the one to help today. [  1   ]                                      The cat always hid under the couch when dogs were in the room. [ 1  ]             2 /2   Fluency: Name maximum number of words in one minute that begin with the letter F                                                                                                                    [    ] 13___ (N > 11 words)            1   /1   ABSTRACTION Similarity between  e.g. banana-orange=fruit                                                                   [  1  ] train-bicycle                      [  1  ] watch-ruler             2 /2   DELAYED  RECALL Has to recall words  WITH NO CUE FACE  [  1  ] VELVET  [  1  ] Faith  [    ]  MARILIA  [    ] RED  [    ] Points for UNCUED recall only          2  /5           OPTIONAL Category cue           Multiple choice cue          ORIENTATION  [  1  ] Date     [ 1   ] Month       [   1 ] Year      [   1 ] Day      [ 1  ] Place        [   1 ] City        6 /6   TOTAL  Normal > 26/30 Add 1 point if < 12 years education     22 /30

## 2022-05-27 ENCOUNTER — LAB (OUTPATIENT)
Dept: LAB | Facility: CLINIC | Age: 77
End: 2022-05-27

## 2022-05-27 DIAGNOSIS — R41.89 COGNITIVE DECLINE: ICD-10-CM

## 2022-05-27 LAB
T4 FREE SERPL-MCNC: 0.76 NG/DL (ref 0.76–1.46)
TSH SERPL DL<=0.005 MIU/L-ACNC: 5.22 MU/L (ref 0.4–4)
VIT B12 SERPL-MCNC: 353 PG/ML (ref 193–986)

## 2022-05-27 PROCEDURE — 84439 ASSAY OF FREE THYROXINE: CPT | Performed by: PATHOLOGY

## 2022-05-27 PROCEDURE — 84425 ASSAY OF VITAMIN B-1: CPT | Mod: 90 | Performed by: PATHOLOGY

## 2022-05-27 PROCEDURE — 82607 VITAMIN B-12: CPT | Performed by: PATHOLOGY

## 2022-05-27 PROCEDURE — 99000 SPECIMEN HANDLING OFFICE-LAB: CPT | Performed by: PATHOLOGY

## 2022-05-27 PROCEDURE — 36415 COLL VENOUS BLD VENIPUNCTURE: CPT | Performed by: PATHOLOGY

## 2022-05-27 PROCEDURE — 84443 ASSAY THYROID STIM HORMONE: CPT | Performed by: PATHOLOGY

## 2022-05-27 PROCEDURE — 83921 ORGANIC ACID SINGLE QUANT: CPT | Mod: 90 | Performed by: PATHOLOGY

## 2022-05-31 LAB — VIT B1 PYROPHOSHATE BLD-SCNC: 145 NMOL/L

## 2022-06-01 ENCOUNTER — HOSPITAL ENCOUNTER (OUTPATIENT)
Dept: MRI IMAGING | Facility: HOSPITAL | Age: 77
Discharge: HOME | End: 2022-06-01
Attending: PSYCHIATRY & NEUROLOGY | Admitting: PSYCHIATRY & NEUROLOGY
Payer: COMMERCIAL

## 2022-06-01 DIAGNOSIS — R41.89 COGNITIVE DECLINE: ICD-10-CM

## 2022-06-01 PROCEDURE — 70551 MRI BRAIN STEM W/O DYE: CPT

## 2022-06-02 LAB — METHYLMALONATE SERPL-SCNC: 0.23 UMOL/L (ref 0–0.4)

## 2022-07-12 ENCOUNTER — ANCILLARY PROCEDURE (OUTPATIENT)
Dept: NEUROLOGY | Facility: CLINIC | Age: 77
End: 2022-07-12
Attending: PSYCHIATRY & NEUROLOGY
Payer: COMMERCIAL

## 2022-07-12 ENCOUNTER — OFFICE VISIT (OUTPATIENT)
Dept: NEUROLOGY | Facility: CLINIC | Age: 77
End: 2022-07-12
Payer: COMMERCIAL

## 2022-07-12 VITALS
BODY MASS INDEX: 29.69 KG/M2 | RESPIRATION RATE: 16 BRPM | WEIGHT: 204 LBS | DIASTOLIC BLOOD PRESSURE: 80 MMHG | SYSTOLIC BLOOD PRESSURE: 132 MMHG | HEART RATE: 62 BPM

## 2022-07-12 DIAGNOSIS — R41.89 COGNITIVE DECLINE: Primary | ICD-10-CM

## 2022-07-12 DIAGNOSIS — R41.89 COGNITIVE DECLINE: ICD-10-CM

## 2022-07-12 PROCEDURE — 95816 EEG AWAKE AND DROWSY: CPT | Performed by: PSYCHIATRY & NEUROLOGY

## 2022-07-12 PROCEDURE — 99215 OFFICE O/P EST HI 40 MIN: CPT | Performed by: PSYCHIATRY & NEUROLOGY

## 2022-07-12 NOTE — NURSING NOTE
Chief Complaint   Patient presents with     Follow Up     Review testing     Tracy Hazel MA,CMA,1:01 PM

## 2022-07-12 NOTE — LETTER
7/12/2022         RE: Sriram Doshi  [address] [facility] [address]        Dear Colleague,    Thank you for referring your patient, Sriram Doshi, to the [facility] NEUROLOGY CLINIC [place]. Please see a copy of my visit note below.    NEUROLOGY OUTPATIENT PROGRESS NOTE  Jul 12, 2022     CHIEF COMPLAINT/REASON FOR VISIT/REASON FOR CONSULT  Patient presents with:  Follow Up: Review testing     REASON FOR CONSULTATION- Memory problems    REFERRAL SOURCE  Dr. Romel Pace V  [facility] Dr. Romel Pace V    HISTORY OF PRESENT ILLNESS  Sriram Doshi is a 77 year old male seen today for evaluation of memory problems.  He reports that he has been having memory problems for the last 6 months that they are getting better with time.  He thinks that they might have brought in by using too much of the atorvastatin and he is cut down from 20 mg to 10 mg with improvement.  He reports difficulty with multitasking.  He has to focus on 1 task at a time.  He will read things and then not remember what he read and has to read them more than once.  Does not really have any difficulty remembering conversations or having difficulty finding words.  No issues with driving.  Occasionally will get lost but this is very rare.  Mainly he will get distracted and forget to take the turn.  Reports that his memory loss is mainly short-term.  Is getting about 6 hours of sleep every night.  Feels well rested in the morning.  Used to work for [employer] with no problems at work.  No personality changes.  No hospitalizations.  No REM behavior disorder.    7/12/22  Patient returns today.  He reports that the memory problems are about the same as before though they are not really prominent.  Has difficulty with multitasking.  Has occasional word finding difficulty.  Explained to him that his symptoms most likely are not related to the Lipitor use.  Reports no hallucinations.  Sleeping well at night.  No other new concerns.   Discussed the importance of completing the neuropsychology evaluation which has not been set up so far.    Previous history is reviewed and this is unchanged.    PAST MEDICAL/SURGICAL HISTORY  History reviewed. No pertinent past medical history.  Patient Active Problem List   Diagnosis     PND (post-nasal drip)     Chronic cough   Significant for high cholesterol, amputated toe    FAMILY HISTORY  History reviewed. No pertinent family history.   Positive for tremors, Parkinson's disease in his father and brother, high blood pressure    SOCIAL HISTORY  Social History     Tobacco Use     Smoking status: Former Smoker     Packs/day: 1.00     Years: 3.00     Pack years: 3.00     Quit date: 3/8/1969     Years since quittin.3     Smokeless tobacco: Never Used       SYSTEMS REVIEW  Twelve-system ROS was done and other than the HPI this was negative except bladder symptoms, hearing loss, memory loss, anxiety, joint pain, numbness and tingling.  No other new concerns/issues.    MEDICATIONS  aspirin (ASA) 81 MG chewable tablet, Take 81 mg by mouth daily  atorvastatin (LIPITOR) 10 MG tablet, Take 10 mg by mouth daily  fluticasone (FLONASE) 50 mcg/actuation nasal spray, [FLUTICASONE (FLONASE) 50 MCG/ACTUATION NASAL SPRAY] 1-2 sprays per nostril daily for 4-6 weeks minimum  LORazepam (ATIVAN) 1 MG tablet, For MRI. Take 1 tab 30 min before and repeat if needed.  tamsulosin (FLOMAX) 0.4 MG capsule,   benzonatate (TESSALON PERLES) 100 MG capsule, [BENZONATATE (TESSALON PERLES) 100 MG CAPSULE] Take 1 capsule (100 mg total) by mouth 3 (three) times a day as needed for cough. (Patient not taking: No sig reported)    No current facility-administered medications on file prior to visit.       PHYSICAL EXAMINATION  VITALS: /80   Pulse 62   Resp 16   Wt 92.5 kg (204 lb)   BMI 29.69 kg/m    GENERAL: Healthy appearing, alert, no acute distress, normal habitus.  CARDIOVASCULAR: Extremities warm and well perfused. Pulses present.    NEUROLOGICAL:  Patient is awake and oriented to self, place and time.  Attention span is normal.  Memory is grossly intact; previously MoCA 22.  Language is fluent and follows commands appropriately.  Appropriate fund of knowledge. Cranial nerves 2-12 are intact. There is no pronator drift.  Motor exam shows 5/5 strength in all extremities.  Tone is symmetric bilaterally in upper and lower extremities.  (Previously reflexes are symmetric and 1+ in upper extremities and lower extremities. Sensory exam is grossly intact to light touch, pin prick and vibration.)  Finger to nose and heel to shin is without dysmetria.  Romberg is negative.  Gait is normal and the patient is able to do tandem walk and walk on toes and heels.  Exam unchanged compared to before.      DIAGNOSTICS  RELEVANT LABS  Component      Latest Ref Rng & Units 2/4/2021   Sodium      136 - 145 mmol/L 141   Potassium      3.5 - 5.0 mmol/L 4.8   Chloride      98 - 107 mmol/L 108 (H)   Carbon Dioxide      22 - 31 mmol/L 20 (L)   Anion Gap      5 - 18 mmol/L 13   Glucose      70 - 125 mg/dL 92   Urea Nitrogen      8 - 28 mg/dL 25   Creatinine      0.70 - 1.30 mg/dL 1.03   GFR Estimate If Black      >60 mL/min/1.73m2 >60   GFR Estimate      >60 mL/min/1.73m2 >60   Bilirubin Total      0.0 - 1.0 mg/dL 0.8   Calcium      8.5 - 10.5 mg/dL 9.1   Protein Total      6.0 - 8.0 g/dL 6.8   Albumin      3.5 - 5.0 g/dL 4.2   Alkaline Phosphatase      45 - 120 U/L 54   AST      0 - 40 U/L 26   ALT      0 - 45 U/L 33       OUTSIDE RECORDS  Outside referral notes and chart notes were reviewed and pertinent information has been summarized (in addition to the HPI):-Referred from vascular clinic.  Has been having memory issues for which was referred to neurology. Doing well with right lower extremity endovascular revascularization.  No previous brain testing/imaging done.    MRI-images reviewed.  No major structural issues noted.  Mild age-related changes.  IMPRESSION:  1.   No finding for intracranial hemorrhage, mass, or acute infarct.     2.  Mild cerebral and cerebellar volume loss. Minor presumed sequelae of chronic microvascular ischemic change.     3.  Extensive opacification the middle ear cavities and mastoid sinuses compatible with inflammatory change.       EEG  IMPRESSION/REPORT/PLAN  This is a normal EEG during wakefulness and drowsiness except mild generalized background dysrhythmia.  Further clinical correlation is needed.     Please note that the absence of epileptiform abnormalities does not exclude the possibility of epilepsy in any patient.     LABS  Component      Latest Ref Rng & Units 5/27/2022   Methylmalonic Acid      0.00 - 0.40 umol/L 0.23   TSH      0.40 - 4.00 mU/L 5.22 (H)   Vitamin B1 Whole Blood Level      70 - 180 nmol/L 145   Vitamin B12      193 - 986 pg/mL 353   T4 Free      0.76 - 1.46 ng/dL 0.76       IMPRESSION/REPORT/PLAN  Cognitive decline-suspected mild cognitive impairment    This is a 77 year old male with cognitive decline over the last 6 months.  Exam is noncontributory.  He does score low on the Johnston at 22.  Possibly he has mild cognitive impairment or could have early dementia.  I do not think his symptoms are coming from the atorvastatin.  MRI has not shown any structural lesions.  EEG was noncontributory.  Blood work is overall been noncontributory.  B12 is slightly on the lower end but methylmalonic acid is within normal range.  We will set him up with neuropsychology for further evaluation to see if there is mild cognitive impairment or not.  Discussed the importance of completing the evaluation.  He was slightly reluctant to complete the testing.    I can see him back after the evaluation.    -     Adult Neuropsychology Referral; Future    Return for In-Clinic Visit (must), After testing.    Over 42 minutes were spent coordinating the care for the patient on the day of the encounter.  This includes previsit, during visit and post  visit activities as documented above.  Counseling patient.  Multiple test reviewed.  MRI images reviewed with the patient.  Discussion of neuropsychology and importance of that.  Discussion of what happens during the neuropsychology appointment.  (Activities include but not inclusive of reviewing chart, reviewing outside records, reviewing labs and imaging study results as well as the images, patient visit time including getting history and exam,  use if applicable, review of test results with the patient and coming up with a plan in a shared model, counseling patient and family, education and answering patient questions, EMR , EMR diagnosis entry and problem list management, medication reconciliation and prescription management if applicable, paperwork if applicable, printing documents and documentation of the visit activities.)      Mick Gracia MD  Neurologist  [facility] Neurology [facility]  Tel:- [phone]    This note was dictated using voice recognition software.  Any grammatical or context distortions are unintentional and inherent to the software.        Again, thank you for allowing me to participate in the care of your patient.        Sincerely,        Mick Gracia MD

## 2022-07-12 NOTE — PROGRESS NOTES
NEUROLOGY OUTPATIENT PROGRESS NOTE  Jul 12, 2022     CHIEF COMPLAINT/REASON FOR VISIT/REASON FOR CONSULT  Patient presents with:  Follow Up: Review testing     REASON FOR CONSULTATION- Memory problems    REFERRAL SOURCE  Dr. Romel Pace V  CC Dr. Romel Pace V    HISTORY OF PRESENT ILLNESS  Sriram Doshi is a 77 year old male seen today for evaluation of memory problems.  He reports that he has been having memory problems for the last 6 months that they are getting better with time.  He thinks that they might have brought in by using too much of the atorvastatin and he is cut down from 20 mg to 10 mg with improvement.  He reports difficulty with multitasking.  He has to focus on 1 task at a time.  He will read things and then not remember what he read and has to read them more than once.  Does not really have any difficulty remembering conversations or having difficulty finding words.  No issues with driving.  Occasionally will get lost but this is very rare.  Mainly he will get distracted and forget to take the turn.  Reports that his memory loss is mainly short-term.  Is getting about 6 hours of sleep every night.  Feels well rested in the morning.  Used to work for [employer] with no problems at work.  No personality changes.  No hospitalizations.  No REM behavior disorder.    7/12/22  Patient returns today.  He reports that the memory problems are about the same as before though they are not really prominent.  Has difficulty with multitasking.  Has occasional word finding difficulty.  Explained to him that his symptoms most likely are not related to the Lipitor use.  Reports no hallucinations.  Sleeping well at night.  No other new concerns.  Discussed the importance of completing the neuropsychology evaluation which has not been set up so far.    Previous history is reviewed and this is unchanged.    PAST MEDICAL/SURGICAL HISTORY  History reviewed. No pertinent past medical history.  Patient Active  Problem List   Diagnosis     PND (post-nasal drip)     Chronic cough   Significant for high cholesterol, amputated toe    FAMILY HISTORY  History reviewed. No pertinent family history.   Positive for tremors, Parkinson's disease in his father and brother, high blood pressure    SOCIAL HISTORY  Social History     Tobacco Use     Smoking status: Former Smoker     Packs/day: 1.00     Years: 3.00     Pack years: 3.00     Quit date: 3/8/1969     Years since quittin.3     Smokeless tobacco: Never Used       SYSTEMS REVIEW  Twelve-system ROS was done and other than the HPI this was negative except bladder symptoms, hearing loss, memory loss, anxiety, joint pain, numbness and tingling.  No other new concerns/issues.    MEDICATIONS  aspirin (ASA) 81 MG chewable tablet, Take 81 mg by mouth daily  atorvastatin (LIPITOR) 10 MG tablet, Take 10 mg by mouth daily  fluticasone (FLONASE) 50 mcg/actuation nasal spray, [FLUTICASONE (FLONASE) 50 MCG/ACTUATION NASAL SPRAY] 1-2 sprays per nostril daily for 4-6 weeks minimum  LORazepam (ATIVAN) 1 MG tablet, For MRI. Take 1 tab 30 min before and repeat if needed.  tamsulosin (FLOMAX) 0.4 MG capsule,   benzonatate (TESSALON PERLES) 100 MG capsule, [BENZONATATE (TESSALON PERLES) 100 MG CAPSULE] Take 1 capsule (100 mg total) by mouth 3 (three) times a day as needed for cough. (Patient not taking: No sig reported)    No current facility-administered medications on file prior to visit.       PHYSICAL EXAMINATION  VITALS: /80   Pulse 62   Resp 16   Wt 92.5 kg (204 lb)   BMI 29.69 kg/m    GENERAL: Healthy appearing, alert, no acute distress, normal habitus.  CARDIOVASCULAR: Extremities warm and well perfused. Pulses present.   NEUROLOGICAL:  Patient is awake and oriented to self, place and time.  Attention span is normal.  Memory is grossly intact; previously MoCA 22.  Language is fluent and follows commands appropriately.  Appropriate fund of knowledge. Cranial nerves 2-12 are  intact. There is no pronator drift.  Motor exam shows 5/5 strength in all extremities.  Tone is symmetric bilaterally in upper and lower extremities.  (Previously reflexes are symmetric and 1+ in upper extremities and lower extremities. Sensory exam is grossly intact to light touch, pin prick and vibration.)  Finger to nose and heel to shin is without dysmetria.  Romberg is negative.  Gait is normal and the patient is able to do tandem walk and walk on toes and heels.  Exam unchanged compared to before.      DIAGNOSTICS  RELEVANT LABS  Component      Latest Ref Rng & Units 2/4/2021   Sodium      136 - 145 mmol/L 141   Potassium      3.5 - 5.0 mmol/L 4.8   Chloride      98 - 107 mmol/L 108 (H)   Carbon Dioxide      22 - 31 mmol/L 20 (L)   Anion Gap      5 - 18 mmol/L 13   Glucose      70 - 125 mg/dL 92   Urea Nitrogen      8 - 28 mg/dL 25   Creatinine      0.70 - 1.30 mg/dL 1.03   GFR Estimate If Black      >60 mL/min/1.73m2 >60   GFR Estimate      >60 mL/min/1.73m2 >60   Bilirubin Total      0.0 - 1.0 mg/dL 0.8   Calcium      8.5 - 10.5 mg/dL 9.1   Protein Total      6.0 - 8.0 g/dL 6.8   Albumin      3.5 - 5.0 g/dL 4.2   Alkaline Phosphatase      45 - 120 U/L 54   AST      0 - 40 U/L 26   ALT      0 - 45 U/L 33       OUTSIDE RECORDS  Outside referral notes and chart notes were reviewed and pertinent information has been summarized (in addition to the HPI):-Referred from vascular clinic.  Has been having memory issues for which was referred to neurology. Doing well with right lower extremity endovascular revascularization.  No previous brain testing/imaging done.    MRI-images reviewed.  No major structural issues noted.  Mild age-related changes.  IMPRESSION:  1.  No finding for intracranial hemorrhage, mass, or acute infarct.     2.  Mild cerebral and cerebellar volume loss. Minor presumed sequelae of chronic microvascular ischemic change.     3.  Extensive opacification the middle ear cavities and mastoid sinuses  compatible with inflammatory change.       EEG  IMPRESSION/REPORT/PLAN  This is a normal EEG during wakefulness and drowsiness except mild generalized background dysrhythmia.  Further clinical correlation is needed.     Please note that the absence of epileptiform abnormalities does not exclude the possibility of epilepsy in any patient.     LABS  Component      Latest Ref Rng & Units 5/27/2022   Methylmalonic Acid      0.00 - 0.40 umol/L 0.23   TSH      0.40 - 4.00 mU/L 5.22 (H)   Vitamin B1 Whole Blood Level      70 - 180 nmol/L 145   Vitamin B12      193 - 986 pg/mL 353   T4 Free      0.76 - 1.46 ng/dL 0.76       IMPRESSION/REPORT/PLAN  Cognitive decline-suspected mild cognitive impairment    This is a 77 year old male with cognitive decline over the last 6 months.  Exam is noncontributory.  He does score low on the Duson at 22.  Possibly he has mild cognitive impairment or could have early dementia.  I do not think his symptoms are coming from the atorvastatin.  MRI has not shown any structural lesions.  EEG was noncontributory.  Blood work is overall been noncontributory.  B12 is slightly on the lower end but methylmalonic acid is within normal range.  We will set him up with neuropsychology for further evaluation to see if there is mild cognitive impairment or not.  Discussed the importance of completing the evaluation.  He was slightly reluctant to complete the testing.    I can see him back after the evaluation.    -     Adult Neuropsychology Referral; Future    Return for In-Clinic Visit (must), After testing.    Over 42 minutes were spent coordinating the care for the patient on the day of the encounter.  This includes previsit, during visit and post visit activities as documented above.  Counseling patient.  Multiple test reviewed.  MRI images reviewed with the patient.  Discussion of neuropsychology and importance of that.  Discussion of what happens during the neuropsychology appointment.  (Activities  include but not inclusive of reviewing chart, reviewing outside records, reviewing labs and imaging study results as well as the images, patient visit time including getting history and exam,  use if applicable, review of test results with the patient and coming up with a plan in a shared model, counseling patient and family, education and answering patient questions, EMR , EMR diagnosis entry and problem list management, medication reconciliation and prescription management if applicable, paperwork if applicable, printing documents and documentation of the visit activities.)      Mick Gracia MD  Neurologist  [facility] Neurology [facility]  Tel:- [phone]    This note was dictated using voice recognition software.  Any grammatical or context distortions are unintentional and inherent to the software.

## 2022-07-25 ENCOUNTER — LAB REQUISITION (OUTPATIENT)
Dept: LAB | Facility: CLINIC | Age: 77
End: 2022-07-25

## 2022-07-25 DIAGNOSIS — E78.5 HYPERLIPIDEMIA, UNSPECIFIED: ICD-10-CM

## 2022-07-25 LAB
ALBUMIN SERPL BCG-MCNC: 4.2 G/DL (ref 3.5–5.2)
ALP SERPL-CCNC: 60 U/L (ref 40–129)
ALT SERPL W P-5'-P-CCNC: 30 U/L (ref 10–50)
ANION GAP SERPL CALCULATED.3IONS-SCNC: 13 MMOL/L (ref 7–15)
AST SERPL W P-5'-P-CCNC: 30 U/L (ref 10–50)
BILIRUB SERPL-MCNC: 0.7 MG/DL
BUN SERPL-MCNC: 24.5 MG/DL (ref 8–23)
CALCIUM SERPL-MCNC: 9 MG/DL (ref 8.8–10.2)
CHLORIDE SERPL-SCNC: 106 MMOL/L (ref 98–107)
CHOLEST SERPL-MCNC: 124 MG/DL
CREAT SERPL-MCNC: 1.05 MG/DL (ref 0.67–1.17)
DEPRECATED HCO3 PLAS-SCNC: 20 MMOL/L (ref 22–29)
GFR SERPL CREATININE-BSD FRML MDRD: 74 ML/MIN/1.73M2
GLUCOSE SERPL-MCNC: 95 MG/DL (ref 70–99)
HDLC SERPL-MCNC: 36 MG/DL
LDLC SERPL CALC-MCNC: 62 MG/DL
NONHDLC SERPL-MCNC: 88 MG/DL
POTASSIUM SERPL-SCNC: 4.3 MMOL/L (ref 3.4–5.3)
PROT SERPL-MCNC: 6.8 G/DL (ref 6.4–8.3)
SODIUM SERPL-SCNC: 139 MMOL/L (ref 136–145)
TRIGL SERPL-MCNC: 131 MG/DL

## 2022-07-25 PROCEDURE — 80053 COMPREHEN METABOLIC PANEL: CPT | Performed by: FAMILY MEDICINE

## 2022-07-25 PROCEDURE — 80061 LIPID PANEL: CPT | Performed by: FAMILY MEDICINE

## 2023-04-13 ENCOUNTER — TRANSCRIBE ORDERS (OUTPATIENT)
Dept: OTHER | Age: 78
End: 2023-04-13

## 2023-04-13 ENCOUNTER — TRANSFERRED RECORDS (OUTPATIENT)
Dept: HEALTH INFORMATION MANAGEMENT | Facility: CLINIC | Age: 78
End: 2023-04-13
Payer: COMMERCIAL

## 2023-04-13 ENCOUNTER — MEDICAL CORRESPONDENCE (OUTPATIENT)
Dept: HEALTH INFORMATION MANAGEMENT | Facility: CLINIC | Age: 78
End: 2023-04-13
Payer: COMMERCIAL

## 2023-04-13 DIAGNOSIS — K40.90 RIGHT INGUINAL HERNIA: Primary | ICD-10-CM

## 2023-04-26 ENCOUNTER — OFFICE VISIT (OUTPATIENT)
Dept: SURGERY | Facility: CLINIC | Age: 78
End: 2023-04-26
Attending: FAMILY MEDICINE
Payer: COMMERCIAL

## 2023-04-26 VITALS
WEIGHT: 194.5 LBS | SYSTOLIC BLOOD PRESSURE: 110 MMHG | DIASTOLIC BLOOD PRESSURE: 64 MMHG | HEIGHT: 70 IN | BODY MASS INDEX: 27.84 KG/M2

## 2023-04-26 DIAGNOSIS — K40.90 RIGHT INGUINAL HERNIA: ICD-10-CM

## 2023-04-26 PROCEDURE — 99204 OFFICE O/P NEW MOD 45 MIN: CPT | Performed by: SURGERY

## 2023-04-26 RX ORDER — ACETAMINOPHEN 325 MG/1
975 TABLET ORAL ONCE
Status: CANCELLED | OUTPATIENT
Start: 2023-04-26 | End: 2023-04-26

## 2023-04-26 RX ORDER — ATORVASTATIN CALCIUM 20 MG/1
TABLET, FILM COATED ORAL
COMMUNITY
Start: 2022-05-06

## 2023-04-26 NOTE — PROGRESS NOTES
"I was asked to consult on this pt by Tian Mauro for evaluation a hernia.    HPI:  This is a 78 year old male here today with concerns of pain and bulging in his right groin. He has noted this for the past 3 weeks. The discomfort he is experiencing is a deep gnawing pain that is worse toward the end of the day.    Allergies:Levofloxacin    No past medical history on file.    Past Surgical History:   Procedure Laterality Date     APPENDECTOMY  1960       CURRENT MEDS:  Current Outpatient Medications   Medication Sig Dispense Refill     aspirin (ASA) 81 MG chewable tablet Take 81 mg by mouth daily       atorvastatin (LIPITOR) 10 MG tablet Take 10 mg by mouth daily       atorvastatin (LIPITOR) 20 MG tablet  (Patient not taking: Reported on 4/26/2023)       benzonatate (TESSALON PERLES) 100 MG capsule [BENZONATATE (TESSALON PERLES) 100 MG CAPSULE] Take 1 capsule (100 mg total) by mouth 3 (three) times a day as needed for cough. (Patient not taking: Reported on 5/24/2022) 90 capsule 6     fluticasone (FLONASE) 50 mcg/actuation nasal spray [FLUTICASONE (FLONASE) 50 MCG/ACTUATION NASAL SPRAY] 1-2 sprays per nostril daily for 4-6 weeks minimum (Patient not taking: Reported on 4/26/2023) 16 g 12     LORazepam (ATIVAN) 1 MG tablet For MRI. Take 1 tab 30 min before and repeat if needed. (Patient not taking: Reported on 4/26/2023) 2 tablet 0     tamsulosin (FLOMAX) 0.4 MG capsule  (Patient not taking: Reported on 4/26/2023)         No family history on file.     reports that he quit smoking about 54 years ago. His smoking use included cigarettes. He has a 3.00 pack-year smoking history. He has never used smokeless tobacco.    Review of Systems -   10 point Review of systems is negative except for; as mentioned above in HPI and PMHx    /64 (BP Location: Right arm, Patient Position: Sitting)   Ht 1.778 m (5' 10\")   Wt 88.2 kg (194 lb 8 oz)   BMI 27.91 kg/m    Body mass index is 27.91 kg/m .    EXAM:  GENERAL: Well " developed male  HEENT: EOMI, Anicteric Sclera  NECK:  No masses, good flexion and extention of the neck  CARDIAC: RRR w/out murmur   CHEST/LUNG: Clear  ABDOMEN: Right inguinal hernia.   GENITAL: Both testicles descended without masses  NEURO: He is ambulatory with good strength in both legs.    IMAGES: none    Assessment/Plan: Pt with a Right inguinal hernia. I discussed this at length with him.  I went over conservative management as well as surgical treatment for hernias.   I would reccomend a laparoscopic inguinal hernia repair, understanding the possibility of converting to an open operation.   I went over the small risks of surgery including but not limited to bleeding and infection. I discussed the expected recovery time as well. We will schedule this hernia repair at his earliest convenience.    Michael Schmidt MD  [facility]; [facility]  [phone]

## 2023-04-26 NOTE — LETTER
4/26/2023         RE: Sriram Doshi  [address] [facility] [address]        Dear Colleague,    Thank you for referring your patient, Sriram Doshi, to the [facility] SURGERY [facility] AND BARIATRICS CARE [place]. Please see a copy of my visit note below.    I was asked to consult on this pt by Tian Mauro for evaluation a hernia.    HPI:  This is a 78 year old male here today with concerns of pain and bulging in his right groin. He has noted this for the past 3 weeks. The discomfort he is experiencing is a deep gnawing pain that is worse toward the end of the day.    Allergies:Levofloxacin    No past medical history on file.    Past Surgical History:   Procedure Laterality Date     APPENDECTOMY  1960       CURRENT MEDS:  Current Outpatient Medications   Medication Sig Dispense Refill     aspirin (ASA) 81 MG chewable tablet Take 81 mg by mouth daily       atorvastatin (LIPITOR) 10 MG tablet Take 10 mg by mouth daily       atorvastatin (LIPITOR) 20 MG tablet  (Patient not taking: Reported on 4/26/2023)       benzonatate (TESSALON PERLES) 100 MG capsule [BENZONATATE (TESSALON PERLES) 100 MG CAPSULE] Take 1 capsule (100 mg total) by mouth 3 (three) times a day as needed for cough. (Patient not taking: Reported on 5/24/2022) 90 capsule 6     fluticasone (FLONASE) 50 mcg/actuation nasal spray [FLUTICASONE (FLONASE) 50 MCG/ACTUATION NASAL SPRAY] 1-2 sprays per nostril daily for 4-6 weeks minimum (Patient not taking: Reported on 4/26/2023) 16 g 12     LORazepam (ATIVAN) 1 MG tablet For MRI. Take 1 tab 30 min before and repeat if needed. (Patient not taking: Reported on 4/26/2023) 2 tablet 0     tamsulosin (FLOMAX) 0.4 MG capsule  (Patient not taking: Reported on 4/26/2023)         No family history on file.     reports that he quit smoking about 54 years ago. His smoking use included cigarettes. He has a 3.00 pack-year smoking history. He has never used smokeless tobacco.    Review of Systems -   10  "point Review of systems is negative except for; as mentioned above in HPI and PMHx    /64 (BP Location: Right arm, Patient Position: Sitting)   Ht 1.778 m (5' 10\")   Wt 88.2 kg (194 lb 8 oz)   BMI 27.91 kg/m    Body mass index is 27.91 kg/m .    EXAM:  GENERAL: Well developed male  HEENT: EOMI, Anicteric Sclera  NECK:  No masses, good flexion and extention of the neck  CARDIAC: RRR w/out murmur   CHEST/LUNG: Clear  ABDOMEN: Right inguinal hernia.   GENITAL: Both testicles descended without masses  NEURO: He is ambulatory with good strength in both legs.    IMAGES: none    Assessment/Plan: Pt with a Right inguinal hernia. I discussed this at length with him.  I went over conservative management as well as surgical treatment for hernias.   I would reccomend a laparoscopic inguinal hernia repair, understanding the possibility of converting to an open operation.   I went over the small risks of surgery including but not limited to bleeding and infection. I discussed the expected recovery time as well. We will schedule this hernia repair at his earliest convenience.    Michael Schmidt MD  [facility]; [facility]  [phone]      Again, thank you for allowing me to participate in the care of your patient.        Sincerely,        Michael Schmidt MD    "

## 2023-04-26 NOTE — H&P (VIEW-ONLY)
"I was asked to consult on this pt by Tian Mauro for evaluation a hernia.    HPI:  This is a 78 year old male here today with concerns of pain and bulging in his right groin. He has noted this for the past 3 weeks. The discomfort he is experiencing is a deep gnawing pain that is worse toward the end of the day.    Allergies:Levofloxacin    No past medical history on file.    Past Surgical History:   Procedure Laterality Date     APPENDECTOMY  1960       CURRENT MEDS:  Current Outpatient Medications   Medication Sig Dispense Refill     aspirin (ASA) 81 MG chewable tablet Take 81 mg by mouth daily       atorvastatin (LIPITOR) 10 MG tablet Take 10 mg by mouth daily       atorvastatin (LIPITOR) 20 MG tablet  (Patient not taking: Reported on 4/26/2023)       benzonatate (TESSALON PERLES) 100 MG capsule [BENZONATATE (TESSALON PERLES) 100 MG CAPSULE] Take 1 capsule (100 mg total) by mouth 3 (three) times a day as needed for cough. (Patient not taking: Reported on 5/24/2022) 90 capsule 6     fluticasone (FLONASE) 50 mcg/actuation nasal spray [FLUTICASONE (FLONASE) 50 MCG/ACTUATION NASAL SPRAY] 1-2 sprays per nostril daily for 4-6 weeks minimum (Patient not taking: Reported on 4/26/2023) 16 g 12     LORazepam (ATIVAN) 1 MG tablet For MRI. Take 1 tab 30 min before and repeat if needed. (Patient not taking: Reported on 4/26/2023) 2 tablet 0     tamsulosin (FLOMAX) 0.4 MG capsule  (Patient not taking: Reported on 4/26/2023)         No family history on file.     reports that he quit smoking about 54 years ago. His smoking use included cigarettes. He has a 3.00 pack-year smoking history. He has never used smokeless tobacco.    Review of Systems -   10 point Review of systems is negative except for; as mentioned above in HPI and PMHx    /64 (BP Location: Right arm, Patient Position: Sitting)   Ht 1.778 m (5' 10\")   Wt 88.2 kg (194 lb 8 oz)   BMI 27.91 kg/m    Body mass index is 27.91 kg/m .    EXAM:  GENERAL: Well " developed male  HEENT: EOMI, Anicteric Sclera  NECK:  No masses, good flexion and extention of the neck  CARDIAC: RRR w/out murmur   CHEST/LUNG: Clear  ABDOMEN: Right inguinal hernia.   GENITAL: Both testicles descended without masses  NEURO: He is ambulatory with good strength in both legs.    IMAGES: none    Assessment/Plan: Pt with a Right inguinal hernia. I discussed this at length with him.  I went over conservative management as well as surgical treatment for hernias.   I would reccomend a laparoscopic inguinal hernia repair, understanding the possibility of converting to an open operation.   I went over the small risks of surgery including but not limited to bleeding and infection. I discussed the expected recovery time as well. We will schedule this hernia repair at his earliest convenience.    Michael Schmidt MD  [facility]; [facility]  [phone]

## 2023-05-01 ENCOUNTER — LAB REQUISITION (OUTPATIENT)
Dept: LAB | Facility: CLINIC | Age: 78
End: 2023-05-01

## 2023-05-01 ENCOUNTER — ANESTHESIA EVENT (OUTPATIENT)
Dept: SURGERY | Facility: AMBULATORY SURGERY CENTER | Age: 78
End: 2023-05-01
Payer: COMMERCIAL

## 2023-05-01 DIAGNOSIS — Z01.818 ENCOUNTER FOR OTHER PREPROCEDURAL EXAMINATION: ICD-10-CM

## 2023-05-01 LAB
ANION GAP SERPL CALCULATED.3IONS-SCNC: 12 MMOL/L (ref 7–15)
BUN SERPL-MCNC: 22.4 MG/DL (ref 8–23)
CALCIUM SERPL-MCNC: 9.2 MG/DL (ref 8.8–10.2)
CHLORIDE SERPL-SCNC: 104 MMOL/L (ref 98–107)
CREAT SERPL-MCNC: 1.06 MG/DL (ref 0.67–1.17)
DEPRECATED HCO3 PLAS-SCNC: 20 MMOL/L (ref 22–29)
GFR SERPL CREATININE-BSD FRML MDRD: 73 ML/MIN/1.73M2
GLUCOSE SERPL-MCNC: 106 MG/DL (ref 70–99)
POTASSIUM SERPL-SCNC: 4.6 MMOL/L (ref 3.4–5.3)
SODIUM SERPL-SCNC: 136 MMOL/L (ref 136–145)

## 2023-05-01 PROCEDURE — 80048 BASIC METABOLIC PNL TOTAL CA: CPT | Performed by: FAMILY MEDICINE

## 2023-05-02 ENCOUNTER — ANESTHESIA (OUTPATIENT)
Dept: SURGERY | Facility: AMBULATORY SURGERY CENTER | Age: 78
End: 2023-05-02
Payer: COMMERCIAL

## 2023-05-02 ENCOUNTER — HOSPITAL ENCOUNTER (OUTPATIENT)
Facility: AMBULATORY SURGERY CENTER | Age: 78
Discharge: HOME | End: 2023-05-02
Attending: SURGERY
Payer: COMMERCIAL

## 2023-05-02 VITALS
SYSTOLIC BLOOD PRESSURE: 100 MMHG | OXYGEN SATURATION: 93 % | HEIGHT: 70 IN | BODY MASS INDEX: 27.84 KG/M2 | RESPIRATION RATE: 16 BRPM | DIASTOLIC BLOOD PRESSURE: 58 MMHG | WEIGHT: 194.5 LBS | TEMPERATURE: 97.4 F | HEART RATE: 66 BPM

## 2023-05-02 DIAGNOSIS — K40.90 RIGHT INGUINAL HERNIA: ICD-10-CM

## 2023-05-02 PROCEDURE — 49650 LAP ING HERNIA REPAIR INIT: CPT | Mod: RT | Performed by: SURGERY

## 2023-05-02 DEVICE — LAP PROGRIP 15X10CM LPG1510X2: Type: IMPLANTABLE DEVICE | Site: GROIN | Status: FUNCTIONAL

## 2023-05-02 RX ORDER — HYDROMORPHONE HCL IN WATER/PF 6 MG/30 ML
0.4 PATIENT CONTROLLED ANALGESIA SYRINGE INTRAVENOUS EVERY 5 MIN PRN
Status: DISCONTINUED | OUTPATIENT
Start: 2023-05-02 | End: 2023-05-03 | Stop reason: HOSPADM

## 2023-05-02 RX ORDER — SODIUM CHLORIDE, SODIUM LACTATE, POTASSIUM CHLORIDE, CALCIUM CHLORIDE 600; 310; 30; 20 MG/100ML; MG/100ML; MG/100ML; MG/100ML
INJECTION, SOLUTION INTRAVENOUS CONTINUOUS
Status: DISCONTINUED | OUTPATIENT
Start: 2023-05-02 | End: 2023-05-03 | Stop reason: HOSPADM

## 2023-05-02 RX ORDER — ONDANSETRON 4 MG/1
4 TABLET, ORALLY DISINTEGRATING ORAL EVERY 30 MIN PRN
Status: DISCONTINUED | OUTPATIENT
Start: 2023-05-02 | End: 2023-05-03 | Stop reason: HOSPADM

## 2023-05-02 RX ORDER — DEXAMETHASONE SODIUM PHOSPHATE 4 MG/ML
INJECTION, SOLUTION INTRA-ARTICULAR; INTRALESIONAL; INTRAMUSCULAR; INTRAVENOUS; SOFT TISSUE PRN
Status: DISCONTINUED | OUTPATIENT
Start: 2023-05-02 | End: 2023-05-02

## 2023-05-02 RX ORDER — MAGNESIUM SULFATE HEPTAHYDRATE 40 MG/ML
4 INJECTION, SOLUTION INTRAVENOUS ONCE
Status: COMPLETED | OUTPATIENT
Start: 2023-05-02 | End: 2023-05-02

## 2023-05-02 RX ORDER — LIDOCAINE 40 MG/G
CREAM TOPICAL
Status: DISCONTINUED | OUTPATIENT
Start: 2023-05-02 | End: 2023-05-03 | Stop reason: HOSPADM

## 2023-05-02 RX ORDER — GLYCOPYRROLATE 0.2 MG/ML
INJECTION, SOLUTION INTRAMUSCULAR; INTRAVENOUS PRN
Status: DISCONTINUED | OUTPATIENT
Start: 2023-05-02 | End: 2023-05-02

## 2023-05-02 RX ORDER — FENTANYL CITRATE 0.05 MG/ML
50 INJECTION, SOLUTION INTRAMUSCULAR; INTRAVENOUS EVERY 5 MIN PRN
Status: DISCONTINUED | OUTPATIENT
Start: 2023-05-02 | End: 2023-05-03 | Stop reason: HOSPADM

## 2023-05-02 RX ORDER — BUPIVACAINE HYDROCHLORIDE AND EPINEPHRINE 2.5; 5 MG/ML; UG/ML
INJECTION, SOLUTION INFILTRATION; PERINEURAL PRN
Status: DISCONTINUED | OUTPATIENT
Start: 2023-05-02 | End: 2023-05-02 | Stop reason: HOSPADM

## 2023-05-02 RX ORDER — FENTANYL CITRATE 0.05 MG/ML
50 INJECTION, SOLUTION INTRAMUSCULAR; INTRAVENOUS
Status: DISCONTINUED | OUTPATIENT
Start: 2023-05-02 | End: 2023-05-03 | Stop reason: HOSPADM

## 2023-05-02 RX ORDER — IBUPROFEN 600 MG/1
600 TABLET, FILM COATED ORAL EVERY 6 HOURS PRN
Qty: 30 TABLET | Refills: 0 | Status: SHIPPED | OUTPATIENT
Start: 2023-05-02

## 2023-05-02 RX ORDER — PROPOFOL 10 MG/ML
INJECTION, EMULSION INTRAVENOUS PRN
Status: DISCONTINUED | OUTPATIENT
Start: 2023-05-02 | End: 2023-05-02

## 2023-05-02 RX ORDER — EPHEDRINE SULFATE 50 MG/ML
INJECTION, SOLUTION INTRAMUSCULAR; INTRAVENOUS; SUBCUTANEOUS PRN
Status: DISCONTINUED | OUTPATIENT
Start: 2023-05-02 | End: 2023-05-02

## 2023-05-02 RX ORDER — CEFAZOLIN SODIUM 2 G/100ML
2 INJECTION, SOLUTION INTRAVENOUS
Status: COMPLETED | OUTPATIENT
Start: 2023-05-02 | End: 2023-05-02

## 2023-05-02 RX ORDER — HYDROCODONE BITARTRATE AND ACETAMINOPHEN 5; 325 MG/1; MG/1
1-2 TABLET ORAL EVERY 4 HOURS PRN
Qty: 12 TABLET | Refills: 0 | Status: SHIPPED | OUTPATIENT
Start: 2023-05-02

## 2023-05-02 RX ORDER — ACETAMINOPHEN 325 MG/1
975 TABLET ORAL ONCE
Status: COMPLETED | OUTPATIENT
Start: 2023-05-02 | End: 2023-05-02

## 2023-05-02 RX ORDER — ACETAMINOPHEN 325 MG/1
650 TABLET ORAL EVERY 4 HOURS PRN
Qty: 50 TABLET | Refills: 0 | Status: SHIPPED | OUTPATIENT
Start: 2023-05-02

## 2023-05-02 RX ORDER — FENTANYL CITRATE 0.05 MG/ML
25 INJECTION, SOLUTION INTRAMUSCULAR; INTRAVENOUS EVERY 5 MIN PRN
Status: DISCONTINUED | OUTPATIENT
Start: 2023-05-02 | End: 2023-05-03 | Stop reason: HOSPADM

## 2023-05-02 RX ORDER — LIDOCAINE HYDROCHLORIDE 20 MG/ML
INJECTION, SOLUTION INFILTRATION; PERINEURAL PRN
Status: DISCONTINUED | OUTPATIENT
Start: 2023-05-02 | End: 2023-05-02

## 2023-05-02 RX ORDER — HYDROMORPHONE HCL IN WATER/PF 6 MG/30 ML
0.2 PATIENT CONTROLLED ANALGESIA SYRINGE INTRAVENOUS EVERY 5 MIN PRN
Status: DISCONTINUED | OUTPATIENT
Start: 2023-05-02 | End: 2023-05-03 | Stop reason: HOSPADM

## 2023-05-02 RX ORDER — ONDANSETRON 2 MG/ML
INJECTION INTRAMUSCULAR; INTRAVENOUS PRN
Status: DISCONTINUED | OUTPATIENT
Start: 2023-05-02 | End: 2023-05-02

## 2023-05-02 RX ORDER — FENTANYL CITRATE 50 UG/ML
INJECTION, SOLUTION INTRAMUSCULAR; INTRAVENOUS PRN
Status: DISCONTINUED | OUTPATIENT
Start: 2023-05-02 | End: 2023-05-02

## 2023-05-02 RX ORDER — CEFAZOLIN SODIUM 2 G/100ML
2 INJECTION, SOLUTION INTRAVENOUS SEE ADMIN INSTRUCTIONS
Status: DISCONTINUED | OUTPATIENT
Start: 2023-05-02 | End: 2023-05-03 | Stop reason: HOSPADM

## 2023-05-02 RX ORDER — ONDANSETRON 2 MG/ML
4 INJECTION INTRAMUSCULAR; INTRAVENOUS EVERY 30 MIN PRN
Status: DISCONTINUED | OUTPATIENT
Start: 2023-05-02 | End: 2023-05-03 | Stop reason: HOSPADM

## 2023-05-02 RX ORDER — NEOSTIGMINE METHYLSULFATE 1 MG/ML
VIAL (ML) INJECTION PRN
Status: DISCONTINUED | OUTPATIENT
Start: 2023-05-02 | End: 2023-05-02

## 2023-05-02 RX ADMIN — LIDOCAINE HYDROCHLORIDE 3 ML: 20 INJECTION, SOLUTION INFILTRATION; PERINEURAL at 08:54

## 2023-05-02 RX ADMIN — ACETAMINOPHEN 975 MG: 325 TABLET ORAL at 08:03

## 2023-05-02 RX ADMIN — FENTANYL CITRATE 50 MCG: 50 INJECTION, SOLUTION INTRAMUSCULAR; INTRAVENOUS at 09:14

## 2023-05-02 RX ADMIN — ONDANSETRON 4 MG: 2 INJECTION INTRAMUSCULAR; INTRAVENOUS at 09:43

## 2023-05-02 RX ADMIN — SODIUM CHLORIDE, SODIUM LACTATE, POTASSIUM CHLORIDE, CALCIUM CHLORIDE: 600; 310; 30; 20 INJECTION, SOLUTION INTRAVENOUS at 08:11

## 2023-05-02 RX ADMIN — SODIUM CHLORIDE, SODIUM LACTATE, POTASSIUM CHLORIDE, CALCIUM CHLORIDE: 600; 310; 30; 20 INJECTION, SOLUTION INTRAVENOUS at 08:10

## 2023-05-02 RX ADMIN — SODIUM CHLORIDE, SODIUM LACTATE, POTASSIUM CHLORIDE, CALCIUM CHLORIDE: 600; 310; 30; 20 INJECTION, SOLUTION INTRAVENOUS at 09:58

## 2023-05-02 RX ADMIN — PROPOFOL 50 MG: 10 INJECTION, EMULSION INTRAVENOUS at 09:33

## 2023-05-02 RX ADMIN — DEXAMETHASONE SODIUM PHOSPHATE 10 MG: 4 INJECTION, SOLUTION INTRA-ARTICULAR; INTRALESIONAL; INTRAMUSCULAR; INTRAVENOUS; SOFT TISSUE at 08:54

## 2023-05-02 RX ADMIN — CEFAZOLIN SODIUM 2 G: 2 INJECTION, SOLUTION INTRAVENOUS at 08:46

## 2023-05-02 RX ADMIN — PROPOFOL 30 MG: 10 INJECTION, EMULSION INTRAVENOUS at 09:48

## 2023-05-02 RX ADMIN — MAGNESIUM SULFATE HEPTAHYDRATE 4 G: 40 INJECTION, SOLUTION INTRAVENOUS at 08:10

## 2023-05-02 RX ADMIN — GLYCOPYRROLATE 0.8 MG: 0.2 INJECTION, SOLUTION INTRAMUSCULAR; INTRAVENOUS at 09:46

## 2023-05-02 RX ADMIN — EPHEDRINE SULFATE 5 MG: 50 INJECTION, SOLUTION INTRAMUSCULAR; INTRAVENOUS; SUBCUTANEOUS at 09:11

## 2023-05-02 RX ADMIN — PROPOFOL 150 MG: 10 INJECTION, EMULSION INTRAVENOUS at 08:54

## 2023-05-02 RX ADMIN — PROPOFOL 50 MG: 10 INJECTION, EMULSION INTRAVENOUS at 08:57

## 2023-05-02 RX ADMIN — FENTANYL CITRATE 25 MCG: 0.05 INJECTION, SOLUTION INTRAMUSCULAR; INTRAVENOUS at 10:14

## 2023-05-02 RX ADMIN — Medication 4 MG: at 09:46

## 2023-05-02 RX ADMIN — Medication 30 MG: at 08:54

## 2023-05-02 RX ADMIN — GLYCOPYRROLATE 0.2 MG: 0.2 INJECTION, SOLUTION INTRAMUSCULAR; INTRAVENOUS at 09:08

## 2023-05-02 RX ADMIN — FENTANYL CITRATE 50 MCG: 50 INJECTION, SOLUTION INTRAMUSCULAR; INTRAVENOUS at 08:54

## 2023-05-02 NOTE — OP NOTE
Name:  Sriram Doshi  PCP:  Tian Mauro  Procedure Date:  5/2/2023      Pre-Procedure Diagnosis:  Right inguinal hernia [K40.90]     Post-Procedure Diagnosis:    *RIH, Direct    Surgeon(s):  Michael Schmidt MD      Anesthesia Type:  GET      Findings:  Direct Right Inguinal Hernia    Operative Report:    The patient was brought to the operating room placed in the supine position and given general endotracheal anesthesia.  She was sterilely prepped and draped in the usual surgical fashion.    Curvilinear incision was made beneath the umbilicus the subcu tissues were dissected through bluntly with S retractors and the superficial rectus sheath fascia to the Right of midline was scored with caurtrey.  The underlying rectus muscle was retracted laterally and superficially and the dilation balloon catheter was advanced superficial to the deep rectus sheath but deep to the rectus muscle.  Under direct visualization of 0  laparoscope the dilation balloon was brought up with 20 pumps of air.  The dilation balloon catheter was replaced with a pneumatic footplate trocar.  The balloon at the end of the trocar was inflated and a pneumo-preperitoneum was brought up to 15 mmHg.  2  5 mm trochars were brought in under direct visualization in the lower midline.  The preperitoneal space was dissected out with blunt dissection and a hernia defect was noted just medial to the epigastric vessels with an incarcerated segment of tissue out inside of the hernia.  The hernia content was reduced and the tissues leading into the internal inguinal ring were dissected out.  The hernia sac itself was drawn into the preperitoneal space with traction and excised with cautery.  The spermatic cord was then dissected out to ensure the patient did not have an indirect hernia as well.  The peritoneum was identified and peeled back and away from the spermatic cord as it led up toward the internal inguinal ring.  A ProGrip inguinal hernia  mesh was inserted and was advanced into the preperitoneal space.  It was unfurled in the appropriate orientation 40 mL of local anesthesia were instilled into that preperitoneal space and the pneumo preperitoneum was deflated.    The superficial rectus sheath fascia at the level of the umbilicus was closed with 0 Vicryl suture.  I closed the skin with a running 4-0 subcuticular Monocryl suture.  The 2  5 mm trocar sites were both closed at the level of the skin with a 4-0 subcu Monocryl stitch.  The wounds were dressed with Telfa and Tegaderm the patient was extubated taken recovery there is no complications with this procedure      Estimated Blood Loss:   5 cc    Specimens:    * No specimens in log *       Drains:        Complications:    None    Michael cShmidt MD     Date: 5/2/2023  Time: 9:58 AM

## 2023-05-02 NOTE — ANESTHESIA PREPROCEDURE EVALUATION
Anesthesia Pre-Procedure Evaluation    Patient: Sriram Doshi   MRN: [id number] : 1945        Procedure : Procedure(s):  HERNIORRHAPHY, INGUINAL, LAPAROSCOPIC          Past Medical History:   Diagnosis Date     Gastroesophageal reflux disease      Thrombosis       Past Surgical History:   Procedure Laterality Date     APPENDECTOMY  1960      Allergies   Allergen Reactions     Levofloxacin Unknown     Caused severe joint pains and aches      Social History     Tobacco Use     Smoking status: Former     Packs/day: 1.00     Years: 3.00     Pack years: 3.00     Types: Cigarettes     Quit date: 3/8/1968     Years since quittin.1     Smokeless tobacco: Never   Vaping Use     Vaping status: Not on file   Substance Use Topics     Alcohol use: Not Currently     Comment: Rarely      Wt Readings from Last 1 Encounters:   23 88.2 kg (194 lb 8 oz)        Anesthesia Evaluation   Pt has had prior anesthetic.     No history of anesthetic complications       ROS/MED HX  ENT/Pulmonary:  - neg pulmonary ROS     Neurologic:  - neg neurologic ROS     Cardiovascular:     (+) Dyslipidemia -Peripheral Vascular Disease----    METS/Exercise Tolerance:     Hematologic:  - neg hematologic  ROS     Musculoskeletal:  - neg musculoskeletal ROS     GI/Hepatic:  - neg GI/hepatic ROS     Renal/Genitourinary:  - neg Renal ROS     Endo:  - neg endo ROS     Psychiatric/Substance Use:  - neg psychiatric ROS     Infectious Disease:  - neg infectious disease ROS     Malignancy:       Other:            Physical Exam    Airway        Mallampati: II   TM distance: > 3 FB   Neck ROM: full   Mouth opening: > 3 cm    Respiratory Devices and Support         Dental     Comment: Good condition        Cardiovascular          Rhythm and rate: regular and normal     Pulmonary           breath sounds clear to auscultation           OUTSIDE LABS:  CBC:   Lab Results   Component Value Date    HGB 15.9 2018     BMP:   Lab Results   Component  Value Date     05/01/2023     07/25/2022    POTASSIUM 4.6 05/01/2023    POTASSIUM 4.3 07/25/2022    CHLORIDE 104 05/01/2023    CHLORIDE 106 07/25/2022    CO2 20 (L) 05/01/2023    CO2 20 (L) 07/25/2022    BUN 22.4 05/01/2023    BUN 24.5 (H) 07/25/2022    CR 1.06 05/01/2023    CR 1.05 07/25/2022     (H) 05/01/2023    GLC 95 07/25/2022     COAGS: No results found for: PTT, INR, FIBR  POC: No results found for: BGM, HCG, HCGS  HEPATIC:   Lab Results   Component Value Date    ALBUMIN 4.2 07/25/2022    PROTTOTAL 6.8 07/25/2022    ALT 30 07/25/2022    AST 30 07/25/2022    ALKPHOS 60 07/25/2022    BILITOTAL 0.7 07/25/2022     OTHER:   Lab Results   Component Value Date    JESÚS 9.2 05/01/2023    MAG 2.1 01/04/2020    TSH 5.22 (H) 05/27/2022    T4 0.76 05/27/2022    SED 19 (H) 01/04/2020       Anesthesia Plan    ASA Status:  2      Anesthesia Type: General.     - Airway: ETT              Consents    Anesthesia Plan(s) and associated risks, benefits, and realistic alternatives discussed. Questions answered and patient/representative(s) expressed understanding.     - Discussed: Risks, Benefits and Alternatives for BOTH SEDATION and the PROCEDURE were discussed     - Discussed with:  Patient         Postoperative Care            Comments:                Radha Hinojosa MD

## 2023-05-02 NOTE — INTERVAL H&P NOTE
"I have reviewed the surgical (or preoperative) H&P that is linked to this encounter, and examined the patient. There are no significant changes    Clinical Conditions Present on Arrival:  Clinically Significant Risk Factors Present on Admission                  # Overweight: Estimated body mass index is 27.91 kg/m  as calculated from the following:    Height as of this encounter: 1.778 m (5' 10\").    Weight as of this encounter: 88.2 kg (194 lb 8 oz).       "

## 2023-05-02 NOTE — ANESTHESIA PROCEDURE NOTES
Airway       Patient location during procedure: OR       Procedure Start/Stop Times: 5/2/2023 8:59 AM  Staff -        Anesthesiologist:  Radha Hinojosa MD       CRNA: Stacy Castro APRN CRNA       Performed By: CRNAIndications and Patient Condition       Indications for airway management: audelia-procedural       Induction type:intravenous       Mask difficulty assessment: 1 - vent by mask    Final Airway Details       Final airway type: endotracheal airway       Successful airway: ETT - single and Oral  Endotracheal Airway Details        ETT size (mm): 7.5       Cuffed: yes       Successful intubation technique: direct laryngoscopy       DL Blade Type: Pace 2       Grade View of Cords: 1       Adjucts: stylet       Position: Right       Measured from: gums/teeth       Secured at (cm): 23       Bite block used: None    Post intubation assessment        Placement verified by: capnometry, equal breath sounds and chest rise        Number of attempts at approach: 2       Secured with: silk tape       Ease of procedure: easy       Dentition: Intact and Unchanged       Dental guard used and removed.    Medication(s) Administered   Medication Administration Time: 5/2/2023 8:59 AM    Additional Comments       Pt not deep enough with initial DL. Additional propofol administered. Second attempt successful.

## 2023-05-02 NOTE — DISCHARGE INSTRUCTIONS
If you have any questions or concerns regarding your procedure, please contact Dr. Schmidt, his office number is [phone].    You received 975 mg of acetaminophen (Tylenol) at 8:03 am. Please do not take an additional dose of Tylenol until after 2:03 PM.    Do not exceed 4,000 mg of acetaminophen in a 24 hour period, keep in mind that acetaminophen can also be found in many over-the-counter cold medications as well as narcotics that may be given for pain.    If you have any questions or concerns regarding your procedure, please contact  {[facility][id number]}.     Discharge Instructions: After Your Surgery    You ve just had surgery. During surgery, you were given medicine called anesthesia to keep you relaxed and free of pain. After surgery, you may have some pain or nausea. This is common. Here are some tips for feeling better and getting well after surgery.    Going home    Your healthcare provider will show you how to take care of yourself when you go home. He or she will also answer your questions. Have an adult family member or friend drive you home. For the first 24 hours after your surgery:  Don't drive or use heavy equipment.  Don't make important decisions or sign legal papers.  Don't drink alcohol.  Have an adult stay with you for 24 hours. He or she can watch for problems and help keep you safe.  Be sure to go to all follow-up visits with your healthcare provider. And rest after your surgery for as long as your healthcare provider tells you to.    Coping with pain    If you have pain after surgery, pain medicine will help you feel better. Take it as told, before pain becomes severe. Also, ask your healthcare provider or pharmacist about other ways to control pain. This might be with heat, ice, or relaxation. And follow any other instructions your surgeon or nurse gives you.    Tips for taking pain medicine    To get the best relief possible, remember these points:  Pain medicines can  upset your stomach. Taking them with a little food may help.  Most pain relievers taken by mouth need at least 20 to 30 minutes to start to work.  Don't wait till your pain becomes severe before you take your medicine. Try to time your medicine so that you can take it before starting an activity. This might be before you get dressed, go for a walk, or sit down for dinner.  Constipation is a common side effect of pain medicines. It's ok to take medicines such as laxatives or stool softeners to help ease constipation. Also ask if you should skip any foods. Drinking lots of fluids and eating foods such as fruits and vegetables that are high in fiber can also help.  Drinking alcohol and taking pain medicine can cause dizziness and slow your breathing. It can even be deadly. Don't drink alcohol while taking pain medicine.  Pain medicine can make you react more slowly to things. Don't drive or run machinery while taking pain medicine.  Your healthcare provider may tell you to take acetaminophen to help ease your pain. Ask him or her how much you are supposed to take each day. Acetaminophen or other pain relievers may interact with your prescription medicines or other over-the-counter (OTC) medicines. Some prescription medicines have acetaminophen and other ingredients. Using both prescription and OTC acetaminophen for pain can cause you to overdose. Read the labels on your OTC medicines with care. This will help you to clearly know the list of ingredients, how much to take, and any warnings. It may also help you not take too much acetaminophen. If you have questions or don't understand the information, ask your pharmacist or healthcare provider to explain it to you before you take the OTC medicine.    Managing nausea    Some people have an upset stomach after surgery. This is often because of anesthesia, pain, or pain medicine, or the stress of surgery. These tips will help you handle nausea and eat healthy foods as you  get better. If you were on a special food plan before surgery, ask your healthcare provider if you should follow it while you get better. These tips may help:    Don't push yourself to eat. Your body will tell you when to eat and how much.  Start off with clear liquids and soup. They are easier to digest.  Next try semi-solid foods, such as mashed potatoes, applesauce, and gelatin, as you feel ready.  Slowly move to solid foods. Don t eat fatty, rich, or spicy foods at first.  Don't force yourself to have 3 large meals a day. Instead eat smaller amounts more often.  Take pain medicines with a small amount of solid food, such as crackers or toast, to prevent nausea.    When to call your healthcare provider    Call your healthcare provider if:  You still have intolerable pain an hour after taking medicine. The medicine may not be strong enough.  You feel too sleepy, dizzy, or groggy. The medicine may be too strong.  You have side effects such as nausea or vomiting, or skin changes such as rash, itching, or hives. Your healthcare provider may suggest other medicines to control side effects.  Rash, itching, or hives may mean you have an allergic reaction. Report this right away. If you have trouble breathing or facial swelling, call 911 right away.    If you have obstructive sleep apnea    You were given anesthesia medicine during surgery to keep you comfortable and free of pain. After surgery, you may have more apnea spells because of this medicine and other medicines you were given. The spells may last longer than usual.   At home:  Keep using the continuous positive airway pressure (CPAP) device when you sleep. Unless your healthcare provider tells you not to, use it when you sleep, day or night. CPAP is a common device used to treat obstructive sleep apnea.  Talk with your provider before taking any pain medicine, muscle relaxants, or sedatives. Your provider will tell you about the possible dangers of taking these  medicines.

## 2023-05-02 NOTE — ANESTHESIA POSTPROCEDURE EVALUATION
Patient: Sriram Doshi    Procedure: Procedure(s):  HERNIORRHAPHY, INGUINAL, LAPAROSCOPIC       Anesthesia Type:  General    Note:  Disposition: Outpatient   Postop Pain Control: Uneventful            Sign Out: Well controlled pain   PONV: No   Neuro/Psych: Uneventful            Sign Out: Acceptable/Baseline neuro status   Airway/Respiratory: Uneventful            Sign Out: Acceptable/Baseline resp. status   CV/Hemodynamics: Uneventful            Sign Out: Acceptable CV status; No obvious hypovolemia; No obvious fluid overload   Other NRE: NONE   DID A NON-ROUTINE EVENT OCCUR? No           Last vitals:  Vitals Value Taken Time   /67 05/02/23 1030   Temp 98  F (36.7  C) 05/02/23 0959   Pulse 66 05/02/23 1030   Resp 16 05/02/23 1030   SpO2 96 % 05/02/23 1030       Electronically Signed By: Radha Hinojosa MD  May 2, 2023  12:49 PM

## 2023-05-02 NOTE — ANESTHESIA CARE TRANSFER NOTE
Patient: Sriram Doshi    Procedure: Procedure(s):  HERNIORRHAPHY, INGUINAL, LAPAROSCOPIC       Diagnosis: Right inguinal hernia [K40.90]  Diagnosis Additional Information: No value filed.    Anesthesia Type:   General     Note:    Oropharynx: oropharynx clear of all foreign objects and spontaneously breathing  Level of Consciousness: drowsy  Oxygen Supplementation: face mask  Level of Supplemental Oxygen (L/min / FiO2): 6  Independent Airway: airway patency satisfactory and stable  Dentition: dentition unchanged  Vital Signs Stable: post-procedure vital signs reviewed and stable  Report to RN Given: handoff report given  Patient transferred to: PACU    Handoff Report: Identifed the Patient, Identified the Reponsible Provider, Reviewed the pertinent medical history, Discussed the surgical course, Reviewed Intra-OP anesthesia mangement and issues during anesthesia, Set expectations for post-procedure period and Allowed opportunity for questions and acknowledgement of understanding      Vitals:  Vitals Value Taken Time   /60 05/02/23 1001   Temp 98  F (36.7  C) 05/02/23 0959   Pulse 94 05/02/23 1001   Resp 20 05/02/23 0959   SpO2 96 % 05/02/23 1001   Vitals shown include unvalidated device data.    Electronically Signed By: TIM Churchill CRNA  May 2, 2023  10:04 AM

## 2023-05-18 ENCOUNTER — VIRTUAL VISIT (OUTPATIENT)
Dept: SURGERY | Facility: CLINIC | Age: 78
End: 2023-05-18
Payer: COMMERCIAL

## 2023-05-18 DIAGNOSIS — K40.90 RIGHT INGUINAL HERNIA: Primary | ICD-10-CM

## 2023-05-18 PROCEDURE — 99024 POSTOP FOLLOW-UP VISIT: CPT | Performed by: PHYSICIAN ASSISTANT

## 2023-05-18 NOTE — LETTER
5/18/2023         RE: Sriram Doshi  [address]  [place] [address]        Dear Colleague,    Thank you for referring your patient, Sriram Doshi, to the [facility] [place]. Please see a copy of my visit note below.    Telemedicine Visit: The patient's condition can be safely assessed and treated via telephone telemedicine encounter.      Reason for Telemedicine Visit: Patient has requested telehealth visit    Originating Site (Patient Location): Patient's home    Distant Site (Provider Location): [facility]    Consent:  The patient/guardian has verbally consented to: the potential risks and benefits of telemedicine (video visit) versus in person care; bill my insurance or make self-payment for services provided; and responsibility for payment of non-covered services.     Mode of Communication:  telephone    As the provider I attest to compliance with applicable laws and regulations related to telemedicine.       HPI: Pt is s/p right lap IHR with Dr. Schmidt on 5/2.   he is doing well.  Pain is well controlled:  Yes. No difficulties with the surgical wound/wounds, no erythema or drainage.  he is eating well and denies fever and chills. Bowel function has returned to normal.      Assessment/Plan: Doing well after surgery and should follow up as needed.    Zackary Echols PA-C  [facility]  Surgery Clinic [address]  [place] [address]?  Office: [phone]          Again, thank you for allowing me to participate in the care of your patient.        Sincerely,        Zackary Echols PA-C

## 2023-05-22 NOTE — PROGRESS NOTES
Telemedicine Visit: The patient's condition can be safely assessed and treated via telephone telemedicine encounter.      Reason for Telemedicine Visit: Patient has requested telehealth visit    Originating Site (Patient Location): Patient's home    Distant Site (Provider Location): [facility]    Consent:  The patient/guardian has verbally consented to: the potential risks and benefits of telemedicine (video visit) versus in person care; bill my insurance or make self-payment for services provided; and responsibility for payment of non-covered services.     Mode of Communication:  telephone    As the provider I attest to compliance with applicable laws and regulations related to telemedicine.       HPI: Pt is s/p right lap IHR with Dr. Schmidt on 5/2.   he is doing well.  Pain is well controlled:  Yes. No difficulties with the surgical wound/wounds, no erythema or drainage.  he is eating well and denies fever and chills. Bowel function has returned to normal.      Assessment/Plan: Doing well after surgery and should follow up as needed.    Zackary Echols PA-C  [facility]  Surgery [facility] - [address] [address]?  Office: [phone]

## 2023-07-18 ENCOUNTER — LAB REQUISITION (OUTPATIENT)
Dept: LAB | Facility: CLINIC | Age: 78
End: 2023-07-18

## 2023-07-18 DIAGNOSIS — E78.5 HYPERLIPIDEMIA, UNSPECIFIED: ICD-10-CM

## 2023-07-18 DIAGNOSIS — Z00.00 ENCOUNTER FOR GENERAL ADULT MEDICAL EXAMINATION WITHOUT ABNORMAL FINDINGS: ICD-10-CM

## 2023-07-18 LAB
ALBUMIN SERPL BCG-MCNC: 4.1 G/DL (ref 3.5–5.2)
ALP SERPL-CCNC: 68 U/L (ref 40–129)
ALT SERPL W P-5'-P-CCNC: 17 U/L (ref 0–70)
ANION GAP SERPL CALCULATED.3IONS-SCNC: 12 MMOL/L (ref 7–15)
AST SERPL W P-5'-P-CCNC: 24 U/L (ref 0–45)
BILIRUB SERPL-MCNC: 0.6 MG/DL
BUN SERPL-MCNC: 16.7 MG/DL (ref 8–23)
CALCIUM SERPL-MCNC: 9.2 MG/DL (ref 8.8–10.2)
CHLORIDE SERPL-SCNC: 103 MMOL/L (ref 98–107)
CHOLEST SERPL-MCNC: 123 MG/DL
CREAT SERPL-MCNC: 0.91 MG/DL (ref 0.67–1.17)
DEPRECATED HCO3 PLAS-SCNC: 22 MMOL/L (ref 22–29)
GFR SERPL CREATININE-BSD FRML MDRD: 87 ML/MIN/1.73M2
GLUCOSE SERPL-MCNC: 101 MG/DL (ref 70–99)
HDLC SERPL-MCNC: 40 MG/DL
LDLC SERPL CALC-MCNC: 70 MG/DL
NONHDLC SERPL-MCNC: 83 MG/DL
POTASSIUM SERPL-SCNC: 4.8 MMOL/L (ref 3.4–5.3)
PROT SERPL-MCNC: 6.7 G/DL (ref 6.4–8.3)
PSA SERPL DL<=0.01 NG/ML-MCNC: 0.76 NG/ML (ref 0–6.5)
SODIUM SERPL-SCNC: 137 MMOL/L (ref 136–145)
TRIGL SERPL-MCNC: 67 MG/DL

## 2023-07-18 PROCEDURE — G0103 PSA SCREENING: HCPCS | Performed by: FAMILY MEDICINE

## 2023-07-18 PROCEDURE — 80053 COMPREHEN METABOLIC PANEL: CPT | Performed by: FAMILY MEDICINE

## 2023-07-18 PROCEDURE — 80061 LIPID PANEL: CPT | Performed by: FAMILY MEDICINE

## 2023-11-10 ENCOUNTER — LAB REQUISITION (OUTPATIENT)
Dept: LAB | Facility: CLINIC | Age: 78
End: 2023-11-10

## 2023-11-10 DIAGNOSIS — J02.9 ACUTE PHARYNGITIS, UNSPECIFIED: ICD-10-CM

## 2023-11-10 PROCEDURE — 87081 CULTURE SCREEN ONLY: CPT | Performed by: FAMILY MEDICINE

## 2023-11-12 LAB — BACTERIA SPEC CULT: NORMAL

## 2023-11-28 ENCOUNTER — LAB REQUISITION (OUTPATIENT)
Dept: LAB | Facility: CLINIC | Age: 78
End: 2023-11-28
Payer: COMMERCIAL

## 2023-11-28 DIAGNOSIS — R22.31 LOCALIZED SWELLING, MASS AND LUMP, RIGHT UPPER LIMB: ICD-10-CM

## 2023-11-28 PROCEDURE — 88304 TISSUE EXAM BY PATHOLOGIST: CPT | Mod: TC,ORL | Performed by: SURGERY

## 2023-11-28 PROCEDURE — 88305 TISSUE EXAM BY PATHOLOGIST: CPT | Mod: TC,ORL | Performed by: SURGERY

## 2023-11-30 LAB
PATH REPORT.COMMENTS IMP SPEC: NORMAL
PATH REPORT.FINAL DX SPEC: NORMAL
PATH REPORT.FINAL DX SPEC: NORMAL
PATH REPORT.GROSS SPEC: NORMAL
PATH REPORT.GROSS SPEC: NORMAL
PATH REPORT.MICROSCOPIC SPEC OTHER STN: NORMAL
PATH REPORT.MICROSCOPIC SPEC OTHER STN: NORMAL
PATH REPORT.RELEVANT HX SPEC: NORMAL
PATH REPORT.RELEVANT HX SPEC: NORMAL
PHOTO IMAGE: NORMAL
PHOTO IMAGE: NORMAL

## 2023-11-30 PROCEDURE — 88304 TISSUE EXAM BY PATHOLOGIST: CPT | Mod: 26 | Performed by: PATHOLOGY

## 2023-11-30 PROCEDURE — 88305 TISSUE EXAM BY PATHOLOGIST: CPT | Mod: 26 | Performed by: PATHOLOGY

## 2024-09-30 ENCOUNTER — LAB REQUISITION (OUTPATIENT)
Dept: LAB | Facility: CLINIC | Age: 79
End: 2024-09-30

## 2024-09-30 DIAGNOSIS — Z13.1 ENCOUNTER FOR SCREENING FOR DIABETES MELLITUS: ICD-10-CM

## 2024-09-30 DIAGNOSIS — Z12.5 ENCOUNTER FOR SCREENING FOR MALIGNANT NEOPLASM OF PROSTATE: ICD-10-CM

## 2024-09-30 DIAGNOSIS — E78.5 HYPERLIPIDEMIA, UNSPECIFIED: ICD-10-CM

## 2024-09-30 LAB
ALBUMIN SERPL BCG-MCNC: 3.9 G/DL (ref 3.5–5.2)
ALP SERPL-CCNC: 67 U/L (ref 40–150)
ALT SERPL W P-5'-P-CCNC: 12 U/L (ref 0–70)
ANION GAP SERPL CALCULATED.3IONS-SCNC: 12 MMOL/L (ref 7–15)
AST SERPL W P-5'-P-CCNC: 17 U/L (ref 0–45)
BILIRUB SERPL-MCNC: 0.4 MG/DL
BUN SERPL-MCNC: 19.1 MG/DL (ref 8–23)
CALCIUM SERPL-MCNC: 8.8 MG/DL (ref 8.8–10.4)
CHLORIDE SERPL-SCNC: 106 MMOL/L (ref 98–107)
CHOLEST SERPL-MCNC: 122 MG/DL
CREAT SERPL-MCNC: 0.92 MG/DL (ref 0.67–1.17)
EGFRCR SERPLBLD CKD-EPI 2021: 86 ML/MIN/1.73M2
FASTING STATUS PATIENT QL REPORTED: ABNORMAL
FASTING STATUS PATIENT QL REPORTED: ABNORMAL
GLUCOSE SERPL-MCNC: 108 MG/DL (ref 70–99)
HCO3 SERPL-SCNC: 21 MMOL/L (ref 22–29)
HDLC SERPL-MCNC: 39 MG/DL
LDLC SERPL CALC-MCNC: 70 MG/DL
NONHDLC SERPL-MCNC: 83 MG/DL
POTASSIUM SERPL-SCNC: 4.5 MMOL/L (ref 3.4–5.3)
PROT SERPL-MCNC: 7.2 G/DL (ref 6.4–8.3)
PSA SERPL DL<=0.01 NG/ML-MCNC: 0.8 NG/ML (ref 0–6.5)
SODIUM SERPL-SCNC: 139 MMOL/L (ref 135–145)
TRIGL SERPL-MCNC: 64 MG/DL

## 2024-09-30 PROCEDURE — 80053 COMPREHEN METABOLIC PANEL: CPT | Performed by: FAMILY MEDICINE

## 2024-09-30 PROCEDURE — 80061 LIPID PANEL: CPT | Performed by: FAMILY MEDICINE

## 2024-09-30 PROCEDURE — G0103 PSA SCREENING: HCPCS | Performed by: FAMILY MEDICINE

## 2024-12-09 ENCOUNTER — LAB REQUISITION (OUTPATIENT)
Dept: LAB | Facility: CLINIC | Age: 79
End: 2024-12-09

## 2024-12-09 DIAGNOSIS — E78.5 HYPERLIPIDEMIA, UNSPECIFIED: ICD-10-CM

## 2024-12-09 LAB
CHOLEST SERPL-MCNC: 124 MG/DL
FASTING STATUS PATIENT QL REPORTED: YES
HDLC SERPL-MCNC: 43 MG/DL
LDLC SERPL CALC-MCNC: 71 MG/DL
NONHDLC SERPL-MCNC: 81 MG/DL
TRIGL SERPL-MCNC: 48 MG/DL

## 2024-12-09 PROCEDURE — 82465 ASSAY BLD/SERUM CHOLESTEROL: CPT | Performed by: FAMILY MEDICINE

## 2024-12-09 PROCEDURE — 80053 COMPREHEN METABOLIC PANEL: CPT | Performed by: FAMILY MEDICINE

## 2024-12-09 PROCEDURE — 84075 ASSAY ALKALINE PHOSPHATASE: CPT | Performed by: FAMILY MEDICINE

## 2024-12-09 PROCEDURE — 82247 BILIRUBIN TOTAL: CPT | Performed by: FAMILY MEDICINE

## 2024-12-10 LAB
ALBUMIN SERPL BCG-MCNC: 4.1 G/DL (ref 3.5–5.2)
ALP SERPL-CCNC: 71 U/L (ref 40–150)
ALT SERPL W P-5'-P-CCNC: 13 U/L (ref 0–70)
ANION GAP SERPL CALCULATED.3IONS-SCNC: 12 MMOL/L (ref 7–15)
AST SERPL W P-5'-P-CCNC: 20 U/L (ref 0–45)
BILIRUB SERPL-MCNC: 0.6 MG/DL
BUN SERPL-MCNC: 16.8 MG/DL (ref 8–23)
CALCIUM SERPL-MCNC: 9.1 MG/DL (ref 8.8–10.4)
CHLORIDE SERPL-SCNC: 101 MMOL/L (ref 98–107)
CREAT SERPL-MCNC: 0.97 MG/DL (ref 0.67–1.17)
EGFRCR SERPLBLD CKD-EPI 2021: 80 ML/MIN/1.73M2
GLUCOSE SERPL-MCNC: 99 MG/DL (ref 70–99)
HCO3 SERPL-SCNC: 23 MMOL/L (ref 22–29)
POTASSIUM SERPL-SCNC: 5 MMOL/L (ref 3.4–5.3)
PROT SERPL-MCNC: 7.3 G/DL (ref 6.4–8.3)
SODIUM SERPL-SCNC: 136 MMOL/L (ref 135–145)

## 2025-07-16 ENCOUNTER — LAB REQUISITION (OUTPATIENT)
Dept: LAB | Facility: CLINIC | Age: 80
End: 2025-07-16

## 2025-07-16 DIAGNOSIS — E78.5 HYPERLIPIDEMIA, UNSPECIFIED: ICD-10-CM

## 2025-07-16 DIAGNOSIS — I73.9 PERIPHERAL VASCULAR DISEASE, UNSPECIFIED: ICD-10-CM

## 2025-07-16 DIAGNOSIS — I25.10 ATHEROSCLEROTIC HEART DISEASE OF NATIVE CORONARY ARTERY WITHOUT ANGINA PECTORIS: ICD-10-CM

## 2025-07-16 PROCEDURE — 82465 ASSAY BLD/SERUM CHOLESTEROL: CPT | Performed by: FAMILY MEDICINE

## 2025-07-16 PROCEDURE — 84155 ASSAY OF PROTEIN SERUM: CPT | Performed by: FAMILY MEDICINE

## 2025-07-17 LAB
ALBUMIN SERPL BCG-MCNC: 3.4 G/DL (ref 3.5–5.2)
ALP SERPL-CCNC: 60 U/L (ref 40–150)
ALT SERPL W P-5'-P-CCNC: 9 U/L (ref 0–70)
ANION GAP SERPL CALCULATED.3IONS-SCNC: 11 MMOL/L (ref 7–15)
AST SERPL W P-5'-P-CCNC: 17 U/L (ref 0–45)
BILIRUB SERPL-MCNC: 0.3 MG/DL
BUN SERPL-MCNC: 23.3 MG/DL (ref 8–23)
CALCIUM SERPL-MCNC: 8.7 MG/DL (ref 8.8–10.4)
CHLORIDE SERPL-SCNC: 105 MMOL/L (ref 98–107)
CHOLEST SERPL-MCNC: 115 MG/DL
CREAT SERPL-MCNC: 0.83 MG/DL (ref 0.67–1.17)
EGFRCR SERPLBLD CKD-EPI 2021: 90 ML/MIN/1.73M2
FASTING STATUS PATIENT QL REPORTED: NO
FASTING STATUS PATIENT QL REPORTED: NO
GLUCOSE SERPL-MCNC: 121 MG/DL (ref 70–99)
HCO3 SERPL-SCNC: 21 MMOL/L (ref 22–29)
HDLC SERPL-MCNC: 35 MG/DL
LDLC SERPL CALC-MCNC: 60 MG/DL
NONHDLC SERPL-MCNC: 80 MG/DL
POTASSIUM SERPL-SCNC: 4.1 MMOL/L (ref 3.4–5.3)
PROT SERPL-MCNC: 6.7 G/DL (ref 6.4–8.3)
SODIUM SERPL-SCNC: 137 MMOL/L (ref 135–145)
TRIGL SERPL-MCNC: 98 MG/DL